# Patient Record
Sex: FEMALE | Race: WHITE | HISPANIC OR LATINO | Employment: FULL TIME | URBAN - METROPOLITAN AREA
[De-identification: names, ages, dates, MRNs, and addresses within clinical notes are randomized per-mention and may not be internally consistent; named-entity substitution may affect disease eponyms.]

---

## 2023-03-15 LAB
BASOPHILS (10*3/UL) IN BLOOD BY AUTOMATED COUNT: 0.04 X10E9/L (ref 0–0.1)
BASOPHILS/100 LEUKOCYTES IN BLOOD BY AUTOMATED COUNT: 0.6 % (ref 0–2)
C REACTIVE PROTEIN (MG/L) IN SER/PLAS: 0.54 MG/DL
CALCIDIOL (25 OH VITAMIN D3) (NG/ML) IN SER/PLAS: 32 NG/ML
EOSINOPHILS (10*3/UL) IN BLOOD BY AUTOMATED COUNT: 0.2 X10E9/L (ref 0–0.7)
EOSINOPHILS/100 LEUKOCYTES IN BLOOD BY AUTOMATED COUNT: 3.2 % (ref 0–6)
ERYTHROCYTE DISTRIBUTION WIDTH (RATIO) BY AUTOMATED COUNT: 12.8 % (ref 11.5–14.5)
ERYTHROCYTE MEAN CORPUSCULAR HEMOGLOBIN CONCENTRATION (G/DL) BY AUTOMATED: 34.1 G/DL (ref 32–36)
ERYTHROCYTE MEAN CORPUSCULAR VOLUME (FL) BY AUTOMATED COUNT: 88 FL (ref 80–100)
ERYTHROCYTES (10*6/UL) IN BLOOD BY AUTOMATED COUNT: 4.49 X10E12/L (ref 4–5.2)
HEMATOCRIT (%) IN BLOOD BY AUTOMATED COUNT: 39.3 % (ref 36–46)
HEMOGLOBIN (G/DL) IN BLOOD: 13.4 G/DL (ref 12–16)
IMMATURE GRANULOCYTES/100 LEUKOCYTES IN BLOOD BY AUTOMATED COUNT: 0.2 % (ref 0–0.9)
IRON (UG/DL) IN SER/PLAS: 84 UG/DL (ref 35–150)
IRON BINDING CAPACITY (UG/DL) IN SER/PLAS: 388 UG/DL (ref 240–445)
IRON SATURATION (%) IN SER/PLAS: 22 % (ref 25–45)
LEUKOCYTES (10*3/UL) IN BLOOD BY AUTOMATED COUNT: 6.3 X10E9/L (ref 4.4–11.3)
LYMPHOCYTES (10*3/UL) IN BLOOD BY AUTOMATED COUNT: 2.8 X10E9/L (ref 1.2–4.8)
LYMPHOCYTES/100 LEUKOCYTES IN BLOOD BY AUTOMATED COUNT: 44.2 % (ref 13–44)
MONOCYTES (10*3/UL) IN BLOOD BY AUTOMATED COUNT: 0.4 X10E9/L (ref 0.1–1)
MONOCYTES/100 LEUKOCYTES IN BLOOD BY AUTOMATED COUNT: 6.3 % (ref 2–10)
NEUTROPHILS (10*3/UL) IN BLOOD BY AUTOMATED COUNT: 2.89 X10E9/L (ref 1.2–7.7)
NEUTROPHILS/100 LEUKOCYTES IN BLOOD BY AUTOMATED COUNT: 45.5 % (ref 40–80)
NRBC (PER 100 WBCS) BY AUTOMATED COUNT: 0 /100 WBC (ref 0–0)
PLATELETS (10*3/UL) IN BLOOD AUTOMATED COUNT: 467 X10E9/L (ref 150–450)
SEDIMENTATION RATE, ERYTHROCYTE: 29 MM/H (ref 0–20)

## 2023-03-16 LAB
ALLERGEN ANIMAL: CAT DANDER IGE (KU/L): <0.1 KU/L
ALLERGEN ANIMAL: DOG DANDER IGE (KU/L): <0.1 KU/L
ALLERGEN GRASS: BERMUDA GRASS (CYNODON DACTYLON) IGE (KU/L): <0.1 KU/L
ALLERGEN GRASS: JOHNSON GRASS (SORGHUM HALEPENSE) IGE (KU/L): <0.1 KU/L
ALLERGEN GRASS: MEADOW GRASS, KENTUCKY BLUE (POA PRATENSIS )IGE (KU/L): <0.1 KU/L
ALLERGEN GRASS: TIMOTHY GRASS (PHLEUM PRATENSE) IGE (KU/L): <0.1 KU/L
ALLERGEN INSECT: COCKROACH IGE: <0.1 KU/L
ALLERGEN MICROORGANISM: ALTERNARIA ALTERNATA IGE (KU/L): <0.1 KU/L
ALLERGEN MICROORGANISM: ASPERGILLUS FUMIGATUS IGE (KU/L): <0.1 KU/L
ALLERGEN MICROORGANISM: CLADOSPORIUM HERBARUM IGE (KU/L): <0.1 KU/L
ALLERGEN MICROORGANISM: PENICILLIUM CHRYSOGENUM (P. NOTATUM) IGE (KU/L): <0.1 KU/L
ALLERGEN MITE: DERMATOPHAGOIDES FARINAE (HOUSE DUST MITE) IGE (KU/L): 2.95 KU/L
ALLERGEN MITE: DERMATOPHAGOIDES PTERONYSSINUS (HOUSE DUST MITE) IGE (KU/L): 2.51 KU/L
ALLERGEN TREE: BOX-ELDER (ACER NEGUNDO) IGE (KU/L): <0.1 KU/L
ALLERGEN TREE: COMMON SILVER BIRCH (BETULA VERRUCOSA) IGE (KU/L): <0.1 KU/L
ALLERGEN TREE: COTTONWOOD (POPULUS DELTOIDES) IGE (KU/L): <0.1 KU/L
ALLERGEN TREE: ELM (ULMUS AMERICANA) IGE (KU/L): <0.1 KU/L
ALLERGEN TREE: MAPLE LEAF SYCAMORE, LONDON PLANE IGE (KU/L): <0.1 KU/L
ALLERGEN TREE: MOUNTAIN JUNIPER (JUNIPERUS SABINOIDES) IGE (KU/L): <0.1 KU/L
ALLERGEN TREE: MULBERRY (MORUS ALBA) IGE (KU/L): <0.1 KU/L
ALLERGEN TREE: OAK (QUERCUS ALBA) IGE (KU/L): <0.1 KU/L
ALLERGEN TREE: PECAN, HICKORY (CARYA PECAN) IGE (KU/L): <0.1 KU/L
ALLERGEN TREE: WALNUT IGE: <0.1 KU/L
ALLERGEN TREE: WHITE ASH (FRAXINUS AMERICANA) IGE (KU/L): <0.1 KU/L
ALLERGEN WEED: COMMON PIGWEED (AMARANTHUS RETROFLEXUS) IGE (KU/L): <0.1 KU/L
ALLERGEN WEED: COMMON RAGWEED (AMB. ARTEMISIIFOLIA/A. ELATIOR) IGE (KU/L): <0.1 KU/L
ALLERGEN WEED: GOOSEFOOT, LAMB'S QUARTERS (CHENOPODIUM ALBUM) IGE (KU/L): <0.1 KU/L
ALLERGEN WEED: PLANTAIN (ENGLISH), RIBWORT (PLANTAGO LANCEOLATA) IGE (KU/L): <0.1 KU/L
ALLERGEN WEED: PRICKLY SALTWORT/RUSSIAN THISTLE (SALSOLA KALI) IGE (KU/L): <0.1 KU/L
ALLERGEN WEED: SHEEP SORREL (RUMEX ACETOSELLA) IGE (KU/L): <0.1 KU/L
ANTI-NUCLEAR ANTIBODY (ANA): NEGATIVE
IMMUNOCAP IGE: 64.6 KU/L (ref 0–214)
IMMUNOCAP INTERPRETATION: ABNORMAL

## 2023-03-18 PROBLEM — F41.8 SITUATIONAL ANXIETY: Status: ACTIVE | Noted: 2023-03-18

## 2023-03-18 PROBLEM — H93.12 LEFT-SIDED TINNITUS: Status: ACTIVE | Noted: 2023-03-18

## 2023-03-18 PROBLEM — N63.0 LUMP OR MASS IN BREAST: Status: ACTIVE | Noted: 2023-03-18

## 2023-03-18 PROBLEM — R10.2 PELVIC PAIN IN FEMALE: Status: ACTIVE | Noted: 2023-03-18

## 2023-03-18 PROBLEM — Z86.69 HISTORY OF MIGRAINE: Status: ACTIVE | Noted: 2023-03-18

## 2023-03-18 PROBLEM — R42 VERTIGO: Status: ACTIVE | Noted: 2023-03-18

## 2023-03-18 PROBLEM — R09.81 NASAL CONGESTION: Status: ACTIVE | Noted: 2023-03-18

## 2023-03-18 PROBLEM — R59.1 LYMPHADENOPATHY: Status: ACTIVE | Noted: 2023-03-18

## 2023-03-18 PROBLEM — F41.9 ANXIETY AND DEPRESSION: Status: ACTIVE | Noted: 2023-03-18

## 2023-03-18 PROBLEM — R43.1 ABNORMAL SMELL: Status: ACTIVE | Noted: 2023-03-18

## 2023-03-18 PROBLEM — J31.0 CHRONIC RHINITIS: Status: ACTIVE | Noted: 2023-03-18

## 2023-03-18 PROBLEM — L81.9 PIGMENTED SKIN LESION OF UNCERTAIN NATURE: Status: ACTIVE | Noted: 2023-03-18

## 2023-03-18 PROBLEM — H91.90 HEARING LOSS: Status: ACTIVE | Noted: 2023-03-18

## 2023-03-18 PROBLEM — J34.2 NASAL SEPTAL DEVIATION: Status: ACTIVE | Noted: 2023-03-18

## 2023-03-18 PROBLEM — M25.511 RIGHT SHOULDER PAIN: Status: ACTIVE | Noted: 2023-03-18

## 2023-03-18 PROBLEM — U07.1 COVID-19 VIRUS INFECTION: Status: ACTIVE | Noted: 2023-03-18

## 2023-03-18 PROBLEM — J34.89 NASAL DRAINAGE: Status: ACTIVE | Noted: 2023-03-18

## 2023-03-18 PROBLEM — E66.9 CLASS 2 OBESITY WITH BODY MASS INDEX (BMI) OF 36.0 TO 36.9 IN ADULT: Status: ACTIVE | Noted: 2023-03-18

## 2023-03-18 PROBLEM — J34.3 HYPERTROPHY OF BOTH INFERIOR NASAL TURBINATES: Status: ACTIVE | Noted: 2023-03-18

## 2023-03-18 PROBLEM — E66.812 CLASS 2 OBESITY WITH BODY MASS INDEX (BMI) OF 36.0 TO 36.9 IN ADULT: Status: ACTIVE | Noted: 2023-03-18

## 2023-03-18 PROBLEM — R35.0 URINARY FREQUENCY: Status: ACTIVE | Noted: 2023-03-18

## 2023-03-18 PROBLEM — E55.9 VITAMIN D DEFICIENCY: Status: ACTIVE | Noted: 2023-03-18

## 2023-03-18 PROBLEM — H52.10 MYOPIA: Status: ACTIVE | Noted: 2023-03-18

## 2023-03-18 PROBLEM — N93.8 DUB (DYSFUNCTIONAL UTERINE BLEEDING): Status: ACTIVE | Noted: 2023-03-18

## 2023-03-18 PROBLEM — F32.A ANXIETY AND DEPRESSION: Status: ACTIVE | Noted: 2023-03-18

## 2023-03-18 PROBLEM — R44.2 PHANTOSMIA: Status: ACTIVE | Noted: 2023-03-18

## 2023-03-18 PROBLEM — D64.9 ANEMIA: Status: ACTIVE | Noted: 2023-03-18

## 2023-03-18 LAB — ZINC,SERUM OR PLASMA: 86.8 UG/DL (ref 60–120)

## 2023-03-18 RX ORDER — FLUTICASONE PROPIONATE 50 MCG
2 SPRAY, SUSPENSION (ML) NASAL DAILY
COMMUNITY

## 2023-03-18 RX ORDER — OMEPRAZOLE 20 MG/1
1 CAPSULE, DELAYED RELEASE ORAL
COMMUNITY
Start: 2019-10-01 | End: 2024-01-10

## 2023-03-21 LAB — VITAMIN B6: 116.9 NMOL/L (ref 20–125)

## 2023-03-24 ENCOUNTER — TELEMEDICINE (OUTPATIENT)
Dept: PRIMARY CARE | Facility: CLINIC | Age: 33
End: 2023-03-24
Payer: COMMERCIAL

## 2023-03-24 DIAGNOSIS — R79.89 LOW VITAMIN D LEVEL: Primary | ICD-10-CM

## 2023-03-24 PROCEDURE — 99212 OFFICE O/P EST SF 10 MIN: CPT | Performed by: NURSE PRACTITIONER

## 2023-03-24 NOTE — PROGRESS NOTES
Pt scheduled for VV  check In call went to VM per DN  No video connect   Call to pt and VM, no answer.  Advised to reschedule ELAINE

## 2023-05-24 LAB
CLUE CELLS: NORMAL
NUGENT SCORE: 3
YEAST: NORMAL

## 2023-09-21 ENCOUNTER — OFFICE VISIT (OUTPATIENT)
Dept: PRIMARY CARE | Facility: CLINIC | Age: 33
End: 2023-09-21
Payer: COMMERCIAL

## 2023-09-21 VITALS
BODY MASS INDEX: 40.34 KG/M2 | DIASTOLIC BLOOD PRESSURE: 80 MMHG | WEIGHT: 242.4 LBS | SYSTOLIC BLOOD PRESSURE: 140 MMHG | TEMPERATURE: 98 F | OXYGEN SATURATION: 98 %

## 2023-09-21 DIAGNOSIS — D64.9 ANEMIA, UNSPECIFIED TYPE: ICD-10-CM

## 2023-09-21 DIAGNOSIS — E55.9 VITAMIN D DEFICIENCY: Primary | ICD-10-CM

## 2023-09-21 DIAGNOSIS — E66.01 CLASS 3 SEVERE OBESITY WITH BODY MASS INDEX (BMI) OF 40.0 TO 44.9 IN ADULT, UNSPECIFIED OBESITY TYPE, UNSPECIFIED WHETHER SERIOUS COMORBIDITY PRESENT (MULTI): ICD-10-CM

## 2023-09-21 DIAGNOSIS — Z00.00 HEALTH CARE MAINTENANCE: ICD-10-CM

## 2023-09-21 PROBLEM — E66.813 CLASS 3 SEVERE OBESITY WITH BODY MASS INDEX (BMI) OF 40.0 TO 44.9 IN ADULT: Status: ACTIVE | Noted: 2023-03-18

## 2023-09-21 PROCEDURE — 3008F BODY MASS INDEX DOCD: CPT | Performed by: NURSE PRACTITIONER

## 2023-09-21 PROCEDURE — 1036F TOBACCO NON-USER: CPT | Performed by: NURSE PRACTITIONER

## 2023-09-21 PROCEDURE — 99214 OFFICE O/P EST MOD 30 MIN: CPT | Performed by: NURSE PRACTITIONER

## 2023-09-21 RX ORDER — BUPROPION HYDROCHLORIDE 100 MG/1
TABLET ORAL
COMMUNITY
Start: 2023-04-19 | End: 2024-01-10

## 2023-09-21 RX ORDER — BUDESONIDE 90 UG/1
AEROSOL, POWDER RESPIRATORY (INHALATION)
COMMUNITY
Start: 2019-10-01 | End: 2024-01-29 | Stop reason: WASHOUT

## 2023-09-21 RX ORDER — MECLIZINE HYDROCHLORIDE 25 MG/1
TABLET ORAL
COMMUNITY
Start: 2020-04-19 | End: 2024-01-10

## 2023-09-21 ASSESSMENT — PATIENT HEALTH QUESTIONNAIRE - PHQ9
1. LITTLE INTEREST OR PLEASURE IN DOING THINGS: NOT AT ALL
2. FEELING DOWN, DEPRESSED OR HOPELESS: NOT AT ALL
SUM OF ALL RESPONSES TO PHQ9 QUESTIONS 1 AND 2: 0

## 2023-09-21 NOTE — PROGRESS NOTES
Subjective   Patient ID: Adrianne Rodríguez is a 33 y.o. female who presents for Weight Check (Having trouble losing weight ).    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    Busy but OK  good to have some time away from the house and kids.  Was seeing someone about mood.    Forgets to take RX then does not feel good.  5 kids  - now has niece and nephew living with them  Wants to have blood work done  Bruising easily  Back on weight watchers for 2 weeks.  Not losing weight.  Tubes are tied but was having 3 periods per month.    Seeing GYN tomorrow.   [place]   Not sure that sugar is an issue.  No dizziness.        Review of Systems   Constitutional:  Positive for fatigue.   Hematological:  Bruises/bleeds easily.   Psychiatric/Behavioral:  The patient is nervous/anxious.    All other systems reviewed and are negative.      Objective   /80   Temp 36.7 °C (98 °F)   Wt 110 kg (242 lb 6.4 oz)   BMI 40.34 kg/m²     Physical Exam  Vitals and nursing note reviewed.   Constitutional:       Appearance: She is obese. She is not ill-appearing.   HENT:      Head: Normocephalic and atraumatic.      Right Ear: Tympanic membrane, ear canal and external ear normal.      Left Ear: Tympanic membrane, ear canal and external ear normal.      Mouth/Throat:      Pharynx: Oropharynx is clear.   Eyes:      Conjunctiva/sclera: Conjunctivae normal.   Neck:      Thyroid: No thyroid mass, thyromegaly or thyroid tenderness.   Cardiovascular:      Rate and Rhythm: Regular rhythm.      Heart sounds: Normal heart sounds.   Pulmonary:      Effort: Pulmonary effort is normal.      Breath sounds: Normal breath sounds.   Abdominal:      General: Bowel sounds are normal.      Palpations: Abdomen is soft.   Neurological:      Mental Status: She is alert and oriented to person, place, and time.   Psychiatric:         Mood and Affect: Mood normal.         Behavior: Behavior normal.         Assessment/Plan   Problem List Items  Addressed This Visit             ICD-10-CM    Anemia D64.9    Relevant Orders    CBC and Auto Differential (Completed)    Iron and TIBC (Completed)    Ferritin (Completed)    Class 3 severe obesity with body mass index (BMI) of 40.0 to 44.9 in adult (CMS/[facility]) E66.01, Z68.41    Relevant Orders    Hemoglobin A1C (Completed)    Health care maintenance Z00.00    Relevant Orders    Comprehensive Metabolic Panel (Completed)    TSH with reflex to Free T4 if abnormal (Completed)    Lipid Panel (Completed)    Vitamin D deficiency - Primary E55.9    Relevant Orders    Vitamin D 25-Hydroxy,Total (for eval of Vitamin D levels) (Completed)

## 2023-09-21 NOTE — PATIENT INSTRUCTIONS
Good to see you today.  Labs when fasting 8 hours, well hydrated and no supplements  Work on consistency with your routine.    I will follow up with the labs.

## 2023-09-22 ENCOUNTER — LAB (OUTPATIENT)
Dept: LAB | Facility: LAB | Age: 33
End: 2023-09-22
Payer: COMMERCIAL

## 2023-09-22 DIAGNOSIS — E66.01 CLASS 3 SEVERE OBESITY WITH BODY MASS INDEX (BMI) OF 40.0 TO 44.9 IN ADULT, UNSPECIFIED OBESITY TYPE, UNSPECIFIED WHETHER SERIOUS COMORBIDITY PRESENT (MULTI): ICD-10-CM

## 2023-09-22 DIAGNOSIS — D64.9 ANEMIA, UNSPECIFIED TYPE: ICD-10-CM

## 2023-09-22 DIAGNOSIS — E55.9 VITAMIN D DEFICIENCY: ICD-10-CM

## 2023-09-22 DIAGNOSIS — Z00.00 HEALTH CARE MAINTENANCE: ICD-10-CM

## 2023-09-22 LAB
ALANINE AMINOTRANSFERASE (SGPT) (U/L) IN SER/PLAS: 18 U/L (ref 7–45)
ALBUMIN (G/DL) IN SER/PLAS: 4.6 G/DL (ref 3.4–5)
ALKALINE PHOSPHATASE (U/L) IN SER/PLAS: 59 U/L (ref 33–110)
ANION GAP IN SER/PLAS: 12 MMOL/L (ref 10–20)
ASPARTATE AMINOTRANSFERASE (SGOT) (U/L) IN SER/PLAS: 17 U/L (ref 9–39)
BASOPHILS (10*3/UL) IN BLOOD BY AUTOMATED COUNT: 0.06 X10E9/L (ref 0–0.1)
BASOPHILS/100 LEUKOCYTES IN BLOOD BY AUTOMATED COUNT: 1.1 % (ref 0–2)
BILIRUBIN TOTAL (MG/DL) IN SER/PLAS: 0.5 MG/DL (ref 0–1.2)
CALCIDIOL (25 OH VITAMIN D3) (NG/ML) IN SER/PLAS: 22 NG/ML
CALCIUM (MG/DL) IN SER/PLAS: 10 MG/DL (ref 8.6–10.6)
CARBON DIOXIDE, TOTAL (MMOL/L) IN SER/PLAS: 27 MMOL/L (ref 21–32)
CHLORIDE (MMOL/L) IN SER/PLAS: 104 MMOL/L (ref 98–107)
CHOLESTEROL (MG/DL) IN SER/PLAS: 180 MG/DL (ref 0–199)
CHOLESTEROL IN HDL (MG/DL) IN SER/PLAS: 41 MG/DL
CHOLESTEROL/HDL RATIO: 4.4
CREATININE (MG/DL) IN SER/PLAS: 0.65 MG/DL (ref 0.5–1.05)
EOSINOPHILS (10*3/UL) IN BLOOD BY AUTOMATED COUNT: 0.23 X10E9/L (ref 0–0.7)
EOSINOPHILS/100 LEUKOCYTES IN BLOOD BY AUTOMATED COUNT: 4.1 % (ref 0–6)
ERYTHROCYTE DISTRIBUTION WIDTH (RATIO) BY AUTOMATED COUNT: 12.3 % (ref 11.5–14.5)
ERYTHROCYTE MEAN CORPUSCULAR HEMOGLOBIN CONCENTRATION (G/DL) BY AUTOMATED: 35 G/DL (ref 32–36)
ERYTHROCYTE MEAN CORPUSCULAR VOLUME (FL) BY AUTOMATED COUNT: 89 FL (ref 80–100)
ERYTHROCYTES (10*6/UL) IN BLOOD BY AUTOMATED COUNT: 4.38 X10E12/L (ref 4–5.2)
ESTIMATED AVERAGE GLUCOSE FOR HBA1C: 97 MG/DL
FERRITIN (UG/LL) IN SER/PLAS: 59 UG/L (ref 8–150)
GFR FEMALE: >90 ML/MIN/1.73M2
GLUCOSE (MG/DL) IN SER/PLAS: 80 MG/DL (ref 74–99)
HEMATOCRIT (%) IN BLOOD BY AUTOMATED COUNT: 39.1 % (ref 36–46)
HEMOGLOBIN (G/DL) IN BLOOD: 13.7 G/DL (ref 12–16)
HEMOGLOBIN A1C/HEMOGLOBIN TOTAL IN BLOOD: 5 %
IMMATURE GRANULOCYTES/100 LEUKOCYTES IN BLOOD BY AUTOMATED COUNT: 0.2 % (ref 0–0.9)
IRON (UG/DL) IN SER/PLAS: 85 UG/DL (ref 35–150)
IRON BINDING CAPACITY (UG/DL) IN SER/PLAS: 364 UG/DL (ref 240–445)
IRON SATURATION (%) IN SER/PLAS: 23 % (ref 25–45)
LDL: 124 MG/DL (ref 0–99)
LEUKOCYTES (10*3/UL) IN BLOOD BY AUTOMATED COUNT: 5.6 X10E9/L (ref 4.4–11.3)
LYMPHOCYTES (10*3/UL) IN BLOOD BY AUTOMATED COUNT: 2.66 X10E9/L (ref 1.2–4.8)
LYMPHOCYTES/100 LEUKOCYTES IN BLOOD BY AUTOMATED COUNT: 47.8 % (ref 13–44)
MONOCYTES (10*3/UL) IN BLOOD BY AUTOMATED COUNT: 0.5 X10E9/L (ref 0.1–1)
MONOCYTES/100 LEUKOCYTES IN BLOOD BY AUTOMATED COUNT: 9 % (ref 2–10)
NEUTROPHILS (10*3/UL) IN BLOOD BY AUTOMATED COUNT: 2.11 X10E9/L (ref 1.2–7.7)
NEUTROPHILS/100 LEUKOCYTES IN BLOOD BY AUTOMATED COUNT: 37.8 % (ref 40–80)
NRBC (PER 100 WBCS) BY AUTOMATED COUNT: 0 /100 WBC (ref 0–0)
PLATELETS (10*3/UL) IN BLOOD AUTOMATED COUNT: 467 X10E9/L (ref 150–450)
POTASSIUM (MMOL/L) IN SER/PLAS: 4.2 MMOL/L (ref 3.5–5.3)
PROTEIN TOTAL: 7.5 G/DL (ref 6.4–8.2)
SODIUM (MMOL/L) IN SER/PLAS: 139 MMOL/L (ref 136–145)
THYROTROPIN (MIU/L) IN SER/PLAS BY DETECTION LIMIT <= 0.05 MIU/L: 0.54 MIU/L (ref 0.44–3.98)
TRIGLYCERIDE (MG/DL) IN SER/PLAS: 75 MG/DL (ref 0–149)
UREA NITROGEN (MG/DL) IN SER/PLAS: 13 MG/DL (ref 6–23)
VLDL: 15 MG/DL (ref 0–40)

## 2023-09-22 PROCEDURE — 84443 ASSAY THYROID STIM HORMONE: CPT

## 2023-09-22 PROCEDURE — 80053 COMPREHEN METABOLIC PANEL: CPT

## 2023-09-22 PROCEDURE — 83036 HEMOGLOBIN GLYCOSYLATED A1C: CPT

## 2023-09-22 PROCEDURE — 85025 COMPLETE CBC W/AUTO DIFF WBC: CPT

## 2023-09-22 PROCEDURE — 82306 VITAMIN D 25 HYDROXY: CPT

## 2023-09-22 PROCEDURE — 80061 LIPID PANEL: CPT

## 2023-09-22 PROCEDURE — 83550 IRON BINDING TEST: CPT

## 2023-09-22 PROCEDURE — 82728 ASSAY OF FERRITIN: CPT

## 2023-09-22 PROCEDURE — 83540 ASSAY OF IRON: CPT

## 2023-09-22 PROCEDURE — 36415 COLL VENOUS BLD VENIPUNCTURE: CPT

## 2023-09-23 LAB
CLUE CELLS: NORMAL
NUGENT SCORE: 2
YEAST: NORMAL

## 2023-09-27 DIAGNOSIS — E61.1 IRON DEFICIENCY: Primary | ICD-10-CM

## 2023-09-30 PROBLEM — Z00.00 HEALTH CARE MAINTENANCE: Status: ACTIVE | Noted: 2023-09-30

## 2023-09-30 ASSESSMENT — ENCOUNTER SYMPTOMS
FATIGUE: 1
NERVOUS/ANXIOUS: 1
BRUISES/BLEEDS EASILY: 1

## 2023-10-31 PROBLEM — H01.139 ECZEMA OF EYELID: Status: ACTIVE | Noted: 2023-04-27

## 2023-10-31 PROBLEM — G43.909 MIGRAINE: Status: ACTIVE | Noted: 2023-10-31

## 2023-10-31 PROBLEM — D23.71 OTHER BENIGN NEOPLASM OF SKIN OF RIGHT LOWER LIMB, INCLUDING HIP: Status: ACTIVE | Noted: 2023-04-27

## 2023-10-31 PROBLEM — E66.9 OBESITY: Status: ACTIVE | Noted: 2017-08-02

## 2023-10-31 PROBLEM — Z98.890 S/P BREAST BIOPSY, LEFT: Status: ACTIVE | Noted: 2023-10-31

## 2023-10-31 PROBLEM — R87.610 ATYPICAL SQUAMOUS CELLS OF UNDETERMINED SIGNIFICANCE (ASCUS) ON PAPANICOLAOU SMEAR OF CERVIX: Status: ACTIVE | Noted: 2023-10-31

## 2023-10-31 PROBLEM — M54.50 LOW BACK PAIN: Status: ACTIVE | Noted: 2017-06-07

## 2023-10-31 PROBLEM — Z98.891 H/O: C-SECTION: Status: ACTIVE | Noted: 2023-10-31

## 2023-10-31 PROBLEM — N76.0 ACUTE VAGINITIS: Status: ACTIVE | Noted: 2023-10-31

## 2023-10-31 PROBLEM — N60.19 FIBROCYSTIC BREAST CHANGES: Status: ACTIVE | Noted: 2017-01-25

## 2023-10-31 PROBLEM — K21.9 GASTROESOPHAGEAL REFLUX DISEASE: Status: ACTIVE | Noted: 2017-10-05

## 2023-10-31 RX ORDER — METRONIDAZOLE 7.5 MG/G
GEL VAGINAL
COMMUNITY
Start: 2023-09-22 | End: 2024-01-10

## 2023-10-31 RX ORDER — DOXYCYCLINE HYCLATE 100 MG
TABLET ORAL
COMMUNITY
Start: 2023-09-27 | End: 2024-01-10

## 2023-11-01 ENCOUNTER — OFFICE VISIT (OUTPATIENT)
Dept: HEMATOLOGY/ONCOLOGY | Facility: CLINIC | Age: 33
End: 2023-11-01
Payer: COMMERCIAL

## 2023-11-01 ENCOUNTER — LAB (OUTPATIENT)
Dept: LAB | Facility: CLINIC | Age: 33
End: 2023-11-01
Payer: COMMERCIAL

## 2023-11-01 VITALS
DIASTOLIC BLOOD PRESSURE: 63 MMHG | HEIGHT: 65 IN | SYSTOLIC BLOOD PRESSURE: 108 MMHG | HEART RATE: 69 BPM | RESPIRATION RATE: 16 BRPM | WEIGHT: 237.66 LBS | BODY MASS INDEX: 39.6 KG/M2 | TEMPERATURE: 97.9 F | OXYGEN SATURATION: 99 %

## 2023-11-01 DIAGNOSIS — E61.1 IRON DEFICIENCY: ICD-10-CM

## 2023-11-01 LAB
ALBUMIN SERPL BCP-MCNC: 4.5 G/DL (ref 3.4–5)
ALP SERPL-CCNC: 52 U/L (ref 33–110)
ALT SERPL W P-5'-P-CCNC: 15 U/L (ref 7–45)
ANION GAP SERPL CALC-SCNC: 12 MMOL/L (ref 10–20)
AST SERPL W P-5'-P-CCNC: 14 U/L (ref 9–39)
BASOPHILS # BLD AUTO: 0.07 X10*3/UL (ref 0–0.1)
BASOPHILS NFR BLD AUTO: 1 %
BILIRUB SERPL-MCNC: 0.5 MG/DL (ref 0–1.2)
BUN SERPL-MCNC: 13 MG/DL (ref 6–23)
CALCIUM SERPL-MCNC: 9.6 MG/DL (ref 8.6–10.3)
CHLORIDE SERPL-SCNC: 104 MMOL/L (ref 98–107)
CO2 SERPL-SCNC: 27 MMOL/L (ref 21–32)
CREAT SERPL-MCNC: 0.71 MG/DL (ref 0.5–1.05)
EOSINOPHIL # BLD AUTO: 0.19 X10*3/UL (ref 0–0.7)
EOSINOPHIL NFR BLD AUTO: 2.7 %
ERYTHROCYTE [DISTWIDTH] IN BLOOD BY AUTOMATED COUNT: 12.5 % (ref 11.5–14.5)
FERRITIN SERPL-MCNC: 62 NG/ML (ref 8–150)
GFR SERPL CREATININE-BSD FRML MDRD: >90 ML/MIN/1.73M*2
GLUCOSE SERPL-MCNC: 75 MG/DL (ref 74–99)
HCT VFR BLD AUTO: 40.8 % (ref 36–46)
HGB BLD-MCNC: 13.7 G/DL (ref 12–16)
IMM GRANULOCYTES # BLD AUTO: 0.02 X10*3/UL (ref 0–0.7)
IMM GRANULOCYTES NFR BLD AUTO: 0.3 % (ref 0–0.9)
IRON SATN MFR SERPL: 29 % (ref 25–45)
IRON SERPL-MCNC: 109 UG/DL (ref 35–150)
LYMPHOCYTES # BLD AUTO: 3.1 X10*3/UL (ref 1.2–4.8)
LYMPHOCYTES NFR BLD AUTO: 44.5 %
MCH RBC QN AUTO: 30.4 PG (ref 26–34)
MCHC RBC AUTO-ENTMCNC: 33.6 G/DL (ref 32–36)
MCV RBC AUTO: 91 FL (ref 80–100)
MONOCYTES # BLD AUTO: 0.64 X10*3/UL (ref 0.1–1)
MONOCYTES NFR BLD AUTO: 9.2 %
NEUTROPHILS # BLD AUTO: 2.95 X10*3/UL (ref 1.2–7.7)
NEUTROPHILS NFR BLD AUTO: 42.3 %
NRBC BLD-RTO: 0 /100 WBCS (ref 0–0)
PLATELET # BLD AUTO: 534 X10*3/UL (ref 150–450)
PMV BLD AUTO: 8.8 FL (ref 7.5–11.5)
POTASSIUM SERPL-SCNC: 4.5 MMOL/L (ref 3.5–5.3)
PROT SERPL-MCNC: 7.4 G/DL (ref 6.4–8.2)
RBC # BLD AUTO: 4.51 X10*6/UL (ref 4–5.2)
SODIUM SERPL-SCNC: 138 MMOL/L (ref 136–145)
TIBC SERPL-MCNC: 372 UG/DL (ref 240–445)
UIBC SERPL-MCNC: 263 UG/DL (ref 110–370)
WBC # BLD AUTO: 7 X10*3/UL (ref 4.4–11.3)

## 2023-11-01 PROCEDURE — 82728 ASSAY OF FERRITIN: CPT | Mod: PARLAB

## 2023-11-01 PROCEDURE — 80053 COMPREHEN METABOLIC PANEL: CPT

## 2023-11-01 PROCEDURE — 1036F TOBACCO NON-USER: CPT | Performed by: INTERNAL MEDICINE

## 2023-11-01 PROCEDURE — 99204 OFFICE O/P NEW MOD 45 MIN: CPT | Performed by: INTERNAL MEDICINE

## 2023-11-01 PROCEDURE — 99214 OFFICE O/P EST MOD 30 MIN: CPT | Performed by: INTERNAL MEDICINE

## 2023-11-01 PROCEDURE — 85025 COMPLETE CBC W/AUTO DIFF WBC: CPT

## 2023-11-01 PROCEDURE — 3008F BODY MASS INDEX DOCD: CPT | Performed by: INTERNAL MEDICINE

## 2023-11-01 PROCEDURE — 83540 ASSAY OF IRON: CPT

## 2023-11-01 PROCEDURE — 36415 COLL VENOUS BLD VENIPUNCTURE: CPT | Mod: PARLAB

## 2023-11-01 RX ORDER — FERROUS SULFATE 300 MG/5ML
60 LIQUID (ML) ORAL DAILY
COMMUNITY

## 2023-11-01 ASSESSMENT — PATIENT HEALTH QUESTIONNAIRE - PHQ9
8. MOVING OR SPEAKING SO SLOWLY THAT OTHER PEOPLE COULD HAVE NOTICED. OR THE OPPOSITE, BEING SO FIGETY OR RESTLESS THAT YOU HAVE BEEN MOVING AROUND A LOT MORE THAN USUAL: 0
1. LITTLE INTEREST OR PLEASURE IN DOING THINGS: NOT AT ALL
SUM OF ALL RESPONSES TO PHQ QUESTIONS 1-9: 0
4. FEELING TIRED OR HAVING LITTLE ENERGY: NOT AT ALL
3. TROUBLE FALLING OR STAYING ASLEEP OR SLEEPING TOO MUCH: NOT AT ALL
2. FEELING DOWN, DEPRESSED OR HOPELESS: NOT AT ALL
3. TROUBLE FALLING OR STAYING ASLEEP OR SLEEPING TOO MUCH: 0
5. POOR APPETITE OR OVEREATING: NOT AT ALL
8. MOVING OR SPEAKING SO SLOWLY THAT OTHER PEOPLE COULD HAVE NOTICED. OR THE OPPOSITE, BEING SO FIGETY OR RESTLESS THAT YOU HAVE BEEN MOVING AROUND A LOT MORE THAN USUAL: NOT AT ALL
2. FEELING DOWN, DEPRESSED, IRRITABLE, OR HOPELESS: NOT AT ALL
4. FEELING TIRED OR HAVING LITTLE ENERGY: NOT AT ALL
2. FEELING DOWN, DEPRESSED, IRRITABLE, OR HOPELESS: 0
8. MOVING OR SPEAKING SO SLOWLY THAT OTHER PEOPLE COULD HAVE NOTICED. OR THE OPPOSITE, BEING SO FIGETY OR RESTLESS THAT YOU HAVE BEEN MOVING AROUND A LOT MORE THAN USUAL: NOT AT ALL
4. FEELING TIRED OR HAVING LITTLE ENERGY: 0
7. TROUBLE CONCENTRATING ON THINGS, SUCH AS READING THE NEWSPAPER OR WATCHING TELEVISION: 0
1. LITTLE INTEREST OR PLEASURE IN DOING THINGS: 0
7. TROUBLE CONCENTRATING ON THINGS, SUCH AS READING THE NEWSPAPER OR WATCHING TELEVISION: NOT AT ALL
SUM OF ALL RESPONSES TO PHQ QUESTIONS 1-9: 0
5. POOR APPETITE OR OVEREATING: NOT AT ALL
1. LITTLE INTEREST OR PLEASURE IN DOING THINGS: NOT AT ALL
9. THOUGHTS THAT YOU WOULD BE BETTER OFF DEAD, OR OF HURTING YOURSELF: NOT AT ALL
9. THOUGHTS THAT YOU WOULD BE BETTER OFF DEAD, OR OF HURTING YOURSELF: NOT AT ALL
6. FEELING BAD ABOUT YOURSELF - OR THAT YOU ARE A FAILURE OR HAVE LET YOURSELF OR YOUR FAMILY DOWN: NOT AT ALL
6. FEELING BAD ABOUT YOURSELF - OR THAT YOU ARE A FAILURE OR HAVE LET YOURSELF OR YOUR FAMILY DOWN: NOT AT ALL
7. TROUBLE CONCENTRATING ON THINGS, SUCH AS READING THE NEWSPAPER OR WATCHING TELEVISION: NOT AT ALL
5. POOR APPETITE OR OVEREATING: 0
3. TROUBLE FALLING OR STAYING ASLEEP OR SLEEPING TOO MUCH: NOT AT ALL
9. THOUGHTS THAT YOU WOULD BE BETTER OFF DEAD, OR OF HURTING YOURSELF: 0
6. FEELING BAD ABOUT YOURSELF - OR THAT YOU ARE A FAILURE OR HAVE LET YOURSELF OR YOUR FAMILY DOWN: 0

## 2023-11-01 ASSESSMENT — PAIN SCALES - GENERAL: PAINLEVEL: 0-NO PAIN

## 2023-11-01 NOTE — PROGRESS NOTES
Patient ID: Adrianne Rodríguez is a 33 y.o. female.  Referring Physician: FADI Galo  [address]  [facility], [place], [address]  [place] [address]  Primary Care Provider: FADI Galo      Subjective    HPI  The patient is a 33-year-old woman referred to me for further evaluation and management of decreased iron saturation as well as elevated platelet count.  A CBC on September 22, 2023 revealed hemoglobin 13.7 g/dL platelets 467,000/mm³ iron saturation decreased at 23%.  The patient complains of easy fatigability and shortness of breath on exertion requesting assistance.  The patient also complains of heavy menstrual cycles.    At interview on November 1, 2023 the patient narrated entire history.  Denied history of weight loss, fevers, night sweats, chest pain, shortness of breath at rest, nausea, vomiting, hematemesis, melena, hematochezia and hematuria.    Active Problems  Problems    · Abnormal smell (781.1) (R43.1)   · Acute vaginitis (616.10) (N76.0)   · Anemia (285.9) (D64.9)   · Antibody response exam (V72.61) (Z01.84)   · Anxiety and depression (300.00,311) (F41.9,F32.A)   · Bacterial vaginosis (616.10,041.9) (N76.0,B96.89)   · Body mass index (BMI) of 37.0 to 37.9 in adult (V85.37) (Z68.37)   · Chronic rhinitis (472.0) (J31.0)   · Class 2 obesity with body mass index (BMI) of 36.0 to 36.9 in adult (278.00,V85.36)  (E66.9,Z68.36)   · DUB (dysfunctional uterine bleeding) (626.8) (N93.8)   · Exposure to communicable disease (V01.9) (Z20.9)   · Hearing loss (389.9) (H91.90)   · History of migraine (V12.49) (Z86.69)   · Hypertrophy of both inferior nasal turbinates (478.0) (J34.3)   · Left-sided tinnitus (388.30) (H93.12)   · Lump or mass in breast (611.72) (N63.0)   · Lymphadenopathy (785.6) (R59.1)   · Myopia (367.1) (H52.10)   · Nasal congestion (478.19) (R09.81)   · Nasal drainage (478.19) (J34.89)   · Nasal septal deviation (470) (J34.2)   · Pelvic pain in female (625.9)  (R10.2)   · 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) (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.2)   · Pigmented skin lesion of uncertain nature (709.00) (L81.9)   · Right shoulder pain (719.41) (M25.511)   · Screening for thyroid disorder (V77.0) (Z13.29)   · Situational anxiety (300.09) (F41.8)   · Urinary frequency (788.41) (R35.0)   · Vertigo (780.4) (R42)   · Vitamin D deficiency (268.9) (E55.9)     Past Medical History  Problems    · History of 14 weeks gestation of pregnancy (V22.2) (Z3A.14)   · Resolved Date: 16 Jun 2021   · History of 16 weeks gestation of pregnancy (V22.2) (Z3A.16)   · Resolved Date: 14 Jul 2021   · History of 20 weeks gestation of pregnancy (V22.2) (Z3A.20)   · Resolved Date: 11 Aug 2021   · History of 24 weeks gestation of pregnancy (V22.2) (Z3A.24)   · Resolved Date: 09 Sep 2021   · History of 28 weeks gestation of pregnancy (V22.2) (Z3A.28)   · Resolved Date: 13 Oct 2021   · History of 33 weeks gestation of pregnancy (V22.2) (Z3A.33)   · Resolved Date: 28 Oct 2021   · History of 35 weeks gestation of pregnancy (V22.2) (Z3A.35)   · Resolved Date: 14 Dec 2021   · History of Abnormal CAT scan (793.99) (R93.89)   · Resolved Date: 30 Mar 2021   · History of Abnormal TSH (790.6) (R79.89)   · Resolved Date: 30 Mar 2021   · History of Acute bronchospasm (519.11) (J98.01)   · Resolved Date: 30 Mar 2021   · History of Amenorrhea, secondary (626.0) (N91.1)   · Resolved Date: 14 Dec 2021   · History of Bilateral knee pain (719.46) (M25.561,M25.562)   · Resolved Date: 26 Apr 2021   · History of Body mass index (BMI) of 40.0 to 44.9 in adult (V85.41) (Z68.41)   · Resolved Date: 26 Apr 2021   · History of COVID-19 virus infection (079.89) (U07.1)   · Resolved Date: 22 May 2023   · 5/2022   · History of Encounter for IUD insertion (V25.11) (Z30.430)   · Resolved Date: 22 May 2023   · History of Encounter for postoperative care (V58.49) (Z48.89)   · Resolved Date: 22 May 2023   · History of Encounter for postpartum visit (V24.2) (Z39.2)   · Resolved Date: 24  Mar 2022   · History of Encounter for supervision of normal pregnancy in multigravida in third  trimester (V22.1) (Z34.83)   · Resolved Date: 14 Dec 2021   · History of Frequent PVCs (427.69) (I49.3)   · Resolved Date: 2021   · History of Hip pain (719.45) (M25.559)   · Resolved Date: 2021   · History of abnormal cervical Papanicolaou smear (V13.29) (Z87.42)   · History of anxiety (V11.8) (Z86.59)   · History of depression (V11.8) (Z86.59)   · History of environmental allergies (V15.09) (Z91.09)   · Resolved Date: 30 Mar 2021   · History of flank pain (V13.89) (Z87.898)   · Resolved Date: 2021   · History of gastroesophageal reflux (GERD) (V12.79) (Z87.19)   · Resolved Date: 14 Mar 2023   · History of influenza vaccination (V49.89) (Z92.29)   · Resolved Date: 14 Dec 2021   · History of palpitations (V12.59) (Z87.898)   · Resolved Date: 2021   · History of pregnancy (V13.29)   · Resolved Date: 14 Dec 2021   · History of syncope (V15.89) (Z87.898)   · Resolved Date: 2021   · History of threatened  (V45.89) (Z87.59)   · Resolved Date: 2021   · History of vertigo (V12.49) (Z87.898)   · Resolved Date: 2022   · 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) (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)   · History of Inappropriate diet and eating habits (V69.1) (Z72.4)   · Resolved Date: 2021   · History of Knee pain, right (719.46) (M25.561)   · Resolved Date: 2021   · History of Low iron stores (790.6) (R79.0)   · Resolved Date: 2021   · History of Major depressive disorder, recurrent episode with anxious distress (296.30)  (F33.9)   · Resolved Date: 2021   · History of Malaise and fatigue (780.79) (R53.81,R53.83)   · Resolved Date: 30 Mar 2021   · History of Mastitis, postpartum (675.24) (O91.22)   · Resolved Date: 30 Mar 2021   · History of Need for Tdap vaccination (V06.1) (Z23)   · Resolved Date: 14 Dec 2021   · History of Obesity (BMI 30-39.9) (278.00) (E66.9)   · Resolved Date: 26  2021   · History of Pain of left breast (611.71) (N64.4)   · Resolved Date: 30 Mar 2021   · History of Positive urine pregnancy test (V72.42) (Z32.01)   · Resolved Date: 2021   · History of Pregnancy, excessive vomiting (643.90) (O21.9)   · Resolved Date: 14 Dec 2021   · History of Vaginal discomfort (625.9) (N94.9)   · Resolved Date: 24 Mar 2022   · History of Vision disturbance (368.9) (H53.9)   · Resolved Date: 2021   · History of Visual field scotoma (368.44) (H53.419)   · Resolved Date: 2021     Surgical History  Problems    · History of Breast surgery   · History of Cervical loop electrosurgical excision   · History of  section   · History of Colposcopy   · History of Tubal ligation     Family History  Mother    · Family history of amblyopia (V19.19) (Z83.518)   · Family history of depression (V17.0) (Z81.8)   · Family history of Substance use disorder  Brother    · Family history of Substance use disorder  Paternal Grandmother    · Family history of malignant neoplasm of breast (V16.3) (Z80.3)  Maternal Aunt    · Family history of malignant neoplasm of brain (V16.8) (Z80.8)   · Family history of malignant neoplasm of breast (V16.3) (Z80.3)  Paternal Aunt    · Family history of bipolar disorder (V17.0) (Z81.8)     Social History  Problems    · Daily caffeine consumption   · Does not chew tobacco (V49.89) (Z78.9)   · Does not use smokeless tobacco (V49.89) (Z78.9)   · Has 3 children   ·    · Never a smoker   · No illicit drug use   · Social alcohol use (Z78.9)   · Student   · Tubal ligation use     Allergies  Medication    · No Known Drug Allergies   Recorded By: Carlee Abreu; 3/6/2018 3:45:05 PM  NonMedication    · No Known Food Allergies   Recorded By: Sarai Hanna; 10/1/2019 4:01:32 PM     Current Meds     Medication Name Instruction   Fluticasone Propionate 50 MCG/ACT Nasal Suspension USE 2 SPRAYS IN EACH NOSTRIL ONCE DAILY   Liletta (52 MG) 20.1 MCG/DAY Intrauterine  "Intrauterine Device     Omeprazole 20 MG Oral Capsule Delayed Release take 1 capsule by mouth every morning 30-60 MINUTES BEFORE BREAKFAST            Review of Systems   All other systems reviewed and are negative.       Objective   BSA: 2.23 meters squared  /63 (BP Location: Left arm, Patient Position: Sitting, BP Cuff Size: Large adult long)   Pulse 69   Temp 36.6 °C (97.9 °F) (Temporal)   Resp 16   Ht 1.66 m (5' 5.35\")   Wt 108 kg (237 lb 10.5 oz)   SpO2 99%   BMI 39.12 kg/m²     Family History   Problem Relation Name Age of Onset    Amblyopia Mother      Depression Mother      Other (Substance Use Disorder) Mother      Other (Substance Use Disorder) Brother      Breast cancer Mother's Sister      Brain cancer Mother's Sister      Bipolar disorder Father's Sister      Breast cancer Paternal Grandmother       Oncology History    No history exists.       Adrianne Rodríguez  reports that she has never smoked. She has never used smokeless tobacco.  She  has no history on file for alcohol use.  She  has no history on file for drug use.    Physical Exam  Constitutional:       Appearance: Normal appearance.   HENT:      Head: Normocephalic and atraumatic.      Nose: Nose normal.      Mouth/Throat:      Mouth: Mucous membranes are moist.      Pharynx: Oropharynx is clear.   Eyes:      Extraocular Movements: Extraocular movements intact.      Conjunctiva/sclera: Conjunctivae normal.      Pupils: Pupils are equal, round, and reactive to light.   Cardiovascular:      Rate and Rhythm: Normal rate and regular rhythm.   Pulmonary:      Effort: Pulmonary effort is normal.      Breath sounds: Normal breath sounds.   Abdominal:      General: Abdomen is flat. Bowel sounds are normal.      Palpations: Abdomen is soft.   Musculoskeletal:         General: Normal range of motion.      Cervical back: Normal range of motion and neck supple.   Neurological:      General: No focal deficit present.      Mental Status: She is " alert and oriented to person, place, and time. Mental status is at baseline.   Psychiatric:         Mood and Affect: Mood normal.         Behavior: Behavior normal.         Thought Content: Thought content normal.         Judgment: Judgment normal.         Performance Status:  Asymptomatic    Assessment/Plan    The patient is a 33-year-old woman referred to me for further evaluation and management of decreased iron saturation as well as elevated platelet count.  A CBC on September 22, 2023 revealed hemoglobin 13.7 g/dL platelets 467,000/mm³ iron saturation decreased at 23%.  The patient complains of easy fatigability and shortness of breath on exertion requesting assistance.  The patient also complains of heavy menstrual cycles.    Physical examination revealed obesity.  I explained to the patient that iron deficiency anemia can culminate into elevated platelet counts.  We reviewed several years worth of data.  The patient hemoglobin at baseline is around 13.5 to 13.7 g/dL.  3 years ago the patient had delivered baby.  During that evaluation the patient's hemoglobin was down to 12.1 g/dL, decreased iron saturation and elevated platelet count.  Since then the patient's platelet count is trending down with current level of 467,000/mm³ with reference range of 150 to 450,000/mm³.  Most likely source of iron deficiency is her heavy menstrual period's.  The patient is currently receiving over-the-counter iron.  I have recommended over-the-counter ferrous sulfate 325 mg p.o. half an hour after food starting with 1 tablet daily and increasing it to 3 times a day.  Reassess in 3 months.  I have recommended a baseline CBC and iron indices for today.  The patient understood appreciated all the details provided and was grateful.    Vitamin D deficiency: Level of 22 NG per mL.    Recommend vitamin D2, 50,000 units by mouth once a week for 12 weeks followed by over-the-counter vitamin D3 1000 units/day.    Thank you for allowing  me to participate in care of your patient if you have any questions please feel free to call me.      Diagnoses and all orders for this visit:  Iron deficiency  -     Referral to Hematology  -     CBC and Auto Differential; Standing  -     Comprehensive Metabolic Panel; Standing  -     Ferritin; Standing  -     Iron and TIBC; Standing  -     Clinic Appointment Request; Future  -     Oncology Line Draw Appointment Request; Future           Kvng Valenzuela MD

## 2023-11-04 DIAGNOSIS — E66.09 CLASS 2 OBESITY DUE TO EXCESS CALORIES WITHOUT SERIOUS COMORBIDITY WITH BODY MASS INDEX (BMI) OF 39.0 TO 39.9 IN ADULT: ICD-10-CM

## 2023-11-04 DIAGNOSIS — M25.50 ARTHRALGIA, UNSPECIFIED JOINT: Primary | ICD-10-CM

## 2023-11-10 PROBLEM — E66.09 CLASS 2 OBESITY DUE TO EXCESS CALORIES WITHOUT SERIOUS COMORBIDITY WITH BODY MASS INDEX (BMI) OF 39.0 TO 39.9 IN ADULT: Status: ACTIVE | Noted: 2017-08-02

## 2023-11-10 PROBLEM — E66.812 CLASS 2 OBESITY DUE TO EXCESS CALORIES WITHOUT SERIOUS COMORBIDITY WITH BODY MASS INDEX (BMI) OF 39.0 TO 39.9 IN ADULT: Status: ACTIVE | Noted: 2017-08-02

## 2023-11-14 ENCOUNTER — APPOINTMENT (OUTPATIENT)
Dept: PRIMARY CARE | Facility: CLINIC | Age: 33
End: 2023-11-14
Payer: COMMERCIAL

## 2023-11-28 ENCOUNTER — APPOINTMENT (OUTPATIENT)
Dept: RHEUMATOLOGY | Facility: CLINIC | Age: 33
End: 2023-11-28
Payer: COMMERCIAL

## 2023-12-15 ENCOUNTER — OFFICE VISIT (OUTPATIENT)
Dept: ORTHOPEDIC SURGERY | Facility: CLINIC | Age: 33
End: 2023-12-15
Payer: COMMERCIAL

## 2023-12-15 DIAGNOSIS — M75.101 TEAR OF RIGHT ROTATOR CUFF, UNSPECIFIED TEAR EXTENT, UNSPECIFIED WHETHER TRAUMATIC: ICD-10-CM

## 2023-12-15 DIAGNOSIS — M67.921 BICEPS TENDINOPATHY, RIGHT: ICD-10-CM

## 2023-12-15 DIAGNOSIS — M25.811 IMPINGEMENT OF RIGHT SHOULDER: ICD-10-CM

## 2023-12-15 DIAGNOSIS — M25.511 RIGHT SHOULDER PAIN, UNSPECIFIED CHRONICITY: ICD-10-CM

## 2023-12-15 PROCEDURE — 1036F TOBACCO NON-USER: CPT | Performed by: PHYSICIAN ASSISTANT

## 2023-12-15 PROCEDURE — 20610 DRAIN/INJ JOINT/BURSA W/O US: CPT | Performed by: PHYSICIAN ASSISTANT

## 2023-12-15 PROCEDURE — 99214 OFFICE O/P EST MOD 30 MIN: CPT | Performed by: PHYSICIAN ASSISTANT

## 2023-12-15 PROCEDURE — 3008F BODY MASS INDEX DOCD: CPT | Performed by: PHYSICIAN ASSISTANT

## 2023-12-15 RX ORDER — TRIAMCINOLONE ACETONIDE 40 MG/ML
40 INJECTION, SUSPENSION INTRA-ARTICULAR; INTRAMUSCULAR
Status: COMPLETED | OUTPATIENT
Start: 2023-12-15 | End: 2023-12-15

## 2023-12-15 RX ORDER — LIDOCAINE HYDROCHLORIDE 5 MG/ML
4 INJECTION, SOLUTION INFILTRATION; PERINEURAL
Status: COMPLETED | OUTPATIENT
Start: 2023-12-15 | End: 2023-12-15

## 2023-12-15 RX ADMIN — TRIAMCINOLONE ACETONIDE 40 MG: 40 INJECTION, SUSPENSION INTRA-ARTICULAR; INTRAMUSCULAR at 10:02

## 2023-12-15 RX ADMIN — LIDOCAINE HYDROCHLORIDE 4 ML: 5 INJECTION, SOLUTION INFILTRATION; PERINEURAL at 10:02

## 2023-12-15 NOTE — PROGRESS NOTES
History of Present Illness  33-year-old female presents for exacerbation of right shoulder pain.  I previously saw the patient for her right shoulder which point we gave her steroid injection and she began physical therapy along with NSAIDs as needed for pain and she only noticed temporary improvement of her right shoulder pain.  We attempted to order an MRI of the right shoulder however it was unfortunately denied.  She returns today for continued right shoulder pain.  She denies any new trauma or injury, though she does have children which she is regularly lifting and carrying.  She also describes pain with overhead reaching and lifting.    Past Medical History:   Diagnosis Date    14 weeks gestation of pregnancy 06/02/2021    14 weeks gestation of pregnancy    16 weeks gestation of pregnancy 06/16/2021    16 weeks gestation of pregnancy    20 weeks gestation of pregnancy 07/14/2021    20 weeks gestation of pregnancy    24 weeks gestation of pregnancy 08/30/2021    24 weeks gestation of pregnancy    28 weeks gestation of pregnancy 09/09/2021    28 weeks gestation of pregnancy    33 weeks gestation of pregnancy 10/13/2021    33 weeks gestation of pregnancy    35 weeks gestation of pregnancy 10/28/2021    35 weeks gestation of pregnancy    Abnormal findings on diagnostic imaging of other specified body structures 04/17/2020    Abnormal CAT scan    Abnormal level of blood mineral 08/25/2020    Low iron stores    Acute bronchospasm 09/26/2019    Acute bronchospasm    Body mass index (BMI)40.0-44.9, adult 02/10/2021    Body mass index (BMI) of 40.0 to 44.9 in adult    Encounter for immunization 10/13/2021    Need for Tdap vaccination    Encounter for pregnancy test, result positive 03/27/2021    Positive urine pregnancy test    Encounter for routine postpartum follow-up 12/15/2021    Encounter for postpartum visit    Encounter for supervision of other normal pregnancy, third trimester 10/28/2021    Encounter for  supervision of normal pregnancy in multigravida in third trimester    Inappropriate diet and eating habits 2020    Inappropriate diet and eating habits    Major depressive disorder, recurrent, unspecified (CMS/[facility]) 01/15/2019    Major depressive disorder, recurrent episode with anxious distress    Mastodynia 2020    Pain of left breast    Nonpurulent mastitis associated with the puerperium 2020    Mastitis, postpartum    Obesity, unspecified 2019    Obesity (BMI 30-39.9)    Other allergy status, other than to drugs and biological substances 2020    History of environmental allergies    Other conditions influencing health status 2021    History of pregnancy    Other malaise 2020    Malaise and fatigue    Other specified abnormal findings of blood chemistry 2020    Abnormal TSH    Pain in right knee     Bilateral knee pain    Pain in right knee 2021    Knee pain, right    Pain in unspecified hip 2019    Hip pain    Papillomavirus as the cause of diseases classified elsewhere     HPV in female    Personal history of other complications of pregnancy, childbirth and the puerperium 2021    History of threatened     Personal history of other diseases of the female genital tract     History of abnormal cervical Papanicolaou smear    Personal history of other drug therapy 10/13/2021    History of influenza vaccination    Personal history of other specified conditions 2020    History of syncope    Personal history of other specified conditions 02/10/2021    History of flank pain    Personal history of other specified conditions 2022    History of vertigo    Personal history of other specified conditions 2019    History of palpitations    Scotoma involving central area, unspecified eye 2020    Visual field scotoma    Secondary amenorrhea 2021    Amenorrhea, secondary    Unspecified condition associated with female genital  organs and menstrual cycle 02/17/2022    Vaginal discomfort    Unspecified visual disturbance 04/28/2020    Vision disturbance    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 Sig Documenting Provider Last Dose Status   budesonide (Pulmicort Flexhaler) 90 mcg/actuation inhaler [id number] No  Historical Provider, MD Taking Active   buPROPion (Wellbutrin) 100 mg tablet [id number] No Take 1 oral tablet once a day Historical Provider, MD Not Taking Active   doxycycline (Vibra-Tabs) 100 mg tablet [id number] No  Historical Provider, MD Not Taking Active   ferrous sulfate 300 mg (60 mg iron)/5 mL syrup [id number]  Take 5 mL (60 mg of iron) by mouth once daily. Historical Provider, MD  Active   fluticasone (Flonase) 50 mcg/actuation nasal spray [id number] No Administer 2 sprays into each nostril once daily. Historical Provider, MD Taking Active   levonorgestrel (LILETTA UTRN) [id number] No 52 mg by intrauterine route. Historical Provider, MD Not Taking Active   meclizine (Antivert) 25 mg tablet [id number] No  Historical Provider, MD Not Taking Active   metroNIDAZOLE (Metrogel) 0.75 % (37.5mg/5 gram) vaginal gel [id number] No  Historical Provider, MD Taking Active   omeprazole (PriLOSEC) 20 mg DR capsule [id number] No Take 1 capsule (20 mg) by mouth once daily in the morning. Take before meals. 30-60 minutes before breakfast Historical Provider, MD Taking Active                    No Known Allergies    Social History     Socioeconomic History    Marital status:      Spouse name: Not on file    Number of children: Not on file    Years of education: Not on file    Highest education level: Not on file   Occupational History    Not on file   Tobacco Use    Smoking status: Never     Smokeless tobacco: Never   Substance and Sexual Activity    Alcohol use: Not on file    Drug use: Not on file    Sexual activity: Not on file   Other Topics Concern    Not on file   Social History Narrative    Not on file     Social Determinants of Health     Financial Resource Strain: Not on file   Food Insecurity: Not on file   Transportation Needs: Not on file   Physical Activity: Not on file   Stress: Not on file   Social Connections: Not on file   Intimate Partner Violence: Not on file   Housing Stability: Not on file       Past Surgical History:   Procedure Laterality Date    OTHER SURGICAL HISTORY  2020     section    OTHER SURGICAL HISTORY  2021    Colposcopy    OTHER SURGICAL HISTORY  2021    Breast surgery    OTHER SURGICAL HISTORY  2021    Cervical loop electrosurgical excision    OTHER SURGICAL HISTORY  2022    Tubal ligation         Review of Systems    GENERAL: Negative  GI: Negative  MUSCULOSKELETAL: See HPI  SKIN: Negative  NEURO:  Negative     Physical Exam  Constitutional  General appearance:  Alert, oriented, and in no acute distress.  Well developed, well nourished.  Head and Face  Head and face:  Normocephalic and atraumatic.  Ears, Nose, Mouth, and Throat  External inspection of ears and nose: Normal.  Eyes:  Pupils are equal and round.  Neck  Neck:  no neck mass was observed.  Pulmonary  Respiratory effort:  no respiratory distress.  Cardiovascular  Intact distal pulses.  Lymphatic  Palpation of lymph nodes in the affected extremity:  Normal.  Skin  Skin and subcutaneous tissue:  Normal skin color and pigmentation.  Normal skin turgor.  No rashes.  Neurologic  Reflexes:  deep tendon reflexes were 2+ and symmetric.  Sensation:  normal to light touch.  Psychiatric  Judgement and insight:  Intact.  Mood and affect:  Normal.  Musculoskeletal  Right shoulder range of motion forward flexion abduction 140 degrees.  Externally rotates 70 degrees.  Internally  rotates to L3.  Positive acromioclavicular joint tenderness.  Positive biceps tendon tenderness.  Positive Neer's.  Positive Reyes.  Jobes positive for pain however she has good strength.  Right upper extremity is neurovascularly intact.    Patient ID: Adrianne Rodríguez is a 33 y.o. female.    L Inj/Asp: R subacromial bursa on 12/15/2023 10:02 AM  Indications: pain  Details: 22 G needle, posterior approach  Medications: 40 mg triamcinolone acetonide 40 mg/mL; 4 mL lidocaine 5 mg/mL (0.5 %)  Outcome: tolerated well, no immediate complications  Procedure, treatment alternatives, risks and benefits explained, specific risks discussed. Consent was given by the patient. Immediately prior to procedure a time out was called to verify the correct patient, procedure, equipment, support staff and site/side marked as required. Patient was prepped and draped in the usual sterile fashion.           Assessment  Right shoulder pain, right shoulder impingement, right shoulder biceps tendinopathy    Plan  Had a long discussion with the patient regarding her right shoulder pain.  Discussed fact that we have already attempted extensive conservative treatment, however unfortunately the MRI was denied.  Therefore we will continue with conservative treatment.  I offer the patient a steroid injection into the right shoulder today which she wanted proceed with and tolerated well.  Encouraged her to continue taking anti-inflammatories as needed.  Will also give her an order for physical therapy to work on strengthening and range of motion of the right shoulder.  We will place an order for an MRI attempt to get that approved.  If the MRI is approved we would then have her follow-up with Dr. Sanders to discuss results and further treatment options.  The patient should call the office during business hours (9am-3pm; Monday - Friday)  with any questions or problems.  If the patient has any urgent issues outside of business hours, they should  go to a local Emergency Room.

## 2024-01-10 ENCOUNTER — PROCEDURE VISIT (OUTPATIENT)
Dept: OBSTETRICS AND GYNECOLOGY | Facility: CLINIC | Age: 34
End: 2024-01-10
Payer: COMMERCIAL

## 2024-01-10 VITALS — HEIGHT: 65 IN | WEIGHT: 240 LBS | BODY MASS INDEX: 39.99 KG/M2

## 2024-01-10 DIAGNOSIS — Z30.432 ENCOUNTER FOR IUD REMOVAL: ICD-10-CM

## 2024-01-10 PROCEDURE — 58301 REMOVE INTRAUTERINE DEVICE: CPT | Performed by: OBSTETRICS & GYNECOLOGY

## 2024-01-10 PROCEDURE — 99213 OFFICE O/P EST LOW 20 MIN: CPT | Performed by: OBSTETRICS & GYNECOLOGY

## 2024-01-10 RX ORDER — NORETHINDRONE ACETATE AND ETHINYL ESTRADIOL 1.5-30(21)
1 KIT ORAL DAILY
Qty: 28 TABLET | Refills: 5 | Status: SHIPPED | OUTPATIENT
Start: 2024-01-10 | End: 2024-01-29 | Stop reason: WASHOUT

## 2024-01-10 NOTE — PROGRESS NOTES
Subjective   Patient ID: Adrianne Rodríguez is a 33 y.o. female who presents for Procedure (Remove Liletta IUD//C/o  pain with IUD/- wants to start a birth control pill//Chaperone declined).  HPI  As contained in the chief complaint  Review of Systems    Objective   Physical Exam  Vital signs reviewed      GENITOURINARY- External genitalia: Normal.                                 - Uterus: AV/AF NSSC, mobile and nontender                                -The adnexal areas are free of tenderness or mass                                -There are no cervical lesions; there is no cervical motion tenderness                                -Vagina without lesions, mucosa pink and well-hydrated, discharge is physiologic.                                   Inspection of Perianal Area: Normal    Assessment/Plan   Diagnoses and all orders for this visit:  Encounter for IUD removal  -IUD removed without difficulty begin a combination birth control pill         Redd Jones DO 01/10/24 11:01 AM

## 2024-01-15 ENCOUNTER — HOSPITAL ENCOUNTER (OUTPATIENT)
Dept: RADIOLOGY | Facility: CLINIC | Age: 34
Discharge: HOME | End: 2024-01-15
Payer: COMMERCIAL

## 2024-01-15 DIAGNOSIS — M75.101 TEAR OF RIGHT ROTATOR CUFF, UNSPECIFIED TEAR EXTENT, UNSPECIFIED WHETHER TRAUMATIC: ICD-10-CM

## 2024-01-15 DIAGNOSIS — M67.921 BICEPS TENDINOPATHY, RIGHT: ICD-10-CM

## 2024-01-15 PROCEDURE — 73221 MRI JOINT UPR EXTREM W/O DYE: CPT | Mod: RT

## 2024-01-15 PROCEDURE — 73221 MRI JOINT UPR EXTREM W/O DYE: CPT | Mod: RIGHT SIDE | Performed by: RADIOLOGY

## 2024-01-16 ENCOUNTER — APPOINTMENT (OUTPATIENT)
Dept: RADIOLOGY | Facility: CLINIC | Age: 34
End: 2024-01-16
Payer: COMMERCIAL

## 2024-01-19 ENCOUNTER — OFFICE VISIT (OUTPATIENT)
Dept: ORTHOPEDIC SURGERY | Facility: CLINIC | Age: 34
End: 2024-01-19
Payer: COMMERCIAL

## 2024-01-19 VITALS — BODY MASS INDEX: 39.32 KG/M2 | HEIGHT: 65 IN | WEIGHT: 236 LBS

## 2024-01-19 DIAGNOSIS — M75.111 INCOMPLETE ROTATOR CUFF TEAR OR RUPTURE OF RIGHT SHOULDER, NOT SPECIFIED AS TRAUMATIC: Primary | ICD-10-CM

## 2024-01-19 DIAGNOSIS — M75.41 IMPINGEMENT SYNDROME OF RIGHT SHOULDER: ICD-10-CM

## 2024-01-19 DIAGNOSIS — S43.431A LABRAL TEAR OF SHOULDER, RIGHT, INITIAL ENCOUNTER: ICD-10-CM

## 2024-01-19 DIAGNOSIS — M75.21 BICEPS TENDINITIS OF RIGHT SHOULDER: ICD-10-CM

## 2024-01-19 PROCEDURE — 3008F BODY MASS INDEX DOCD: CPT | Performed by: ORTHOPAEDIC SURGERY

## 2024-01-19 PROCEDURE — 20610 DRAIN/INJ JOINT/BURSA W/O US: CPT | Performed by: ORTHOPAEDIC SURGERY

## 2024-01-19 PROCEDURE — 99214 OFFICE O/P EST MOD 30 MIN: CPT | Performed by: ORTHOPAEDIC SURGERY

## 2024-01-19 PROCEDURE — 1036F TOBACCO NON-USER: CPT | Performed by: ORTHOPAEDIC SURGERY

## 2024-01-19 RX ORDER — LIDOCAINE HYDROCHLORIDE 10 MG/ML
4 INJECTION INFILTRATION; PERINEURAL
Status: COMPLETED | OUTPATIENT
Start: 2024-01-19 | End: 2024-01-19

## 2024-01-19 RX ADMIN — LIDOCAINE HYDROCHLORIDE 4 ML: 10 INJECTION INFILTRATION; PERINEURAL at 11:48

## 2024-01-19 ASSESSMENT — PAIN SCALES - GENERAL: PAINLEVEL_OUTOF10: 9

## 2024-01-19 ASSESSMENT — PAIN - FUNCTIONAL ASSESSMENT: PAIN_FUNCTIONAL_ASSESSMENT: 0-10

## 2024-01-19 NOTE — PROGRESS NOTES
33-year-old female with continued right shoulder pain.  Patient is already done extensive conservative treatment with subacromial steroid injections physical therapy and anti-inflammatories.  She continues getting pain over the anterior lateral and posterior aspects of her right shoulder.  Pain is worse with writing doing laundry and carrying things    Patients' self reported past medical history, medications, allergies, surgical history, family and social history as well as a 10 point review of systems has been documented in the new patient intake form and scanned into the patient's electronic medical record.  The intake form was reviewed by Dr Sanders during the office visit and signed by Dr. Sanders and the patient.  Pertinent findings are documented in the HPI.    General Multi-System Physical Exam:  Constitutional  General appearance:  Alert, oriented, and in no acute distress.  Well developed, well nourished.  Head and Face  Head and face:  Normocephalic and atraumatic.  Ears, Nose, Mouth, and Throat  External inspection of ears and nose: Normal.  Eyes:  Pupils are equal and round.  Neck  Neck:  no neck mass was observed.  Pulmonary  Respiratory effort:  no respiratory distress.  Cardiovascular  Intact distal pulses.  Lymphatic  Palpation of lymph nodes in the affected extremity:  Normal.  Skin  Skin and subcutaneous tissue:  Normal skin color and pigmentation.  Normal skin turgor.  No rashes.  Neurologic  Sensation:  normal to light touch.  Psychiatric  Judgement and insight:  Intact.  Mood and affect:  Normal.  Musculoskeletal  Right shoulder is 140 degrees of abduction forward flexion 80 degrees of external rotation internal rotates L1 very positive biceps tenderness that recreates her pain mild acromioclavicular joint tenderness mild tenderness over her os acromion positive Neer positive Reyes positive Jobes with pain and mild weakness    Dr Sanders independently interpreted the patient's MRI (performed by  the Radiology department) by viewing the MRI images and this is Dr. Sanders's personal interpretation:     MRI of the right shoulder shows mild supraspinatus tendinitis with mild subacromial bursitis and an os acromion as well as labrum tearing and biceps tendinopathy    We discussed her os acromion.  Due to the fact that she only mildly tender over this I do not think this is causing majority of her symptoms.  At this time I think majority of her symptoms are coming from her biceps.  We talked about trying a steroid injection of the biceps which she wanted to proceed with and tolerated well.  If the injection really helps her biceps pain we could consider a shoulder scope with arthroscopic biceps tenodesis.  However if the injection does not help her at all then that would be a sign that the biceps is not really causing the symptoms.  Will see her back as needed        This patient has had longstanding pain and weakness in their affected extremity which has gotten worse over the last few months.  Non-operative treatment has failed to help this patient and the pain is worsening.  That would classify this problem as a chronic illness with exacerbation and progression.    Due to this patient's condition, they are at a moderate risk of morbidity from additional diagnostic testing / treatment.      To help them with their pain, I wrote them a prescription for prescription strength anti-inflammatories.  The patient was informed that there are risks of using nonsteroidal antiinflammatory (NSAID) medications.    Risks of NSAIDS include, but are not limited to, upset stomach, ulcers in the stomach and other places in the gastrointestinal tract, and a mild increase in cardiovascular risk as a result of the antiinflammatory medications.  In addition, there is an increased risk in bleeding as a result of the medications.    The patient was advised to stop taking the NSAIDs if they cause them to have an upset stomach.  NSAIDs  are not supposed to be taken every day for more than a few weeks.  If they have any questions or problems with the antiinflammatory medications, they should stop taking the medication immediately and call the office.      Patient ID: Adrianne Rodríguez is a 33 y.o. female.    L Inj/Asp: R subacromial bursa (R shoulder injection - bicipital groove of the proximal humerus) on 1/19/2024 11:48 AM  Indications: pain  Details: 22 G needle, anterior approach  Medications: 40 mg triamcinolone acetonide 10 mg/mL; 4 mL lidocaine 10 mg/mL (1 %)    Right shoulder bicipital groove of proximal humerus injection  Procedure, treatment alternatives, risks and benefits explained, specific risks discussed. Consent was given by the patient. Immediately prior to procedure a time out was called to verify the correct patient, procedure, equipment, support staff and site/side marked as required. Patient was prepped and draped in the usual sterile fashion.

## 2024-01-29 ENCOUNTER — OFFICE VISIT (OUTPATIENT)
Dept: PRIMARY CARE | Facility: CLINIC | Age: 34
End: 2024-01-29
Payer: COMMERCIAL

## 2024-01-29 VITALS
BODY MASS INDEX: 38.61 KG/M2 | TEMPERATURE: 97.3 F | SYSTOLIC BLOOD PRESSURE: 124 MMHG | WEIGHT: 232 LBS | DIASTOLIC BLOOD PRESSURE: 82 MMHG

## 2024-01-29 DIAGNOSIS — G25.81 RESTLESS LEG SYNDROME: Primary | ICD-10-CM

## 2024-01-29 PROBLEM — S69.90XA INJURY OF WRIST: Status: ACTIVE | Noted: 2024-01-29

## 2024-01-29 PROBLEM — E66.01 SEVERE OBESITY (MULTI): Status: ACTIVE | Noted: 2023-03-18

## 2024-01-29 PROBLEM — E66.9 OBESITY DUE TO ENERGY IMBALANCE: Status: ACTIVE | Noted: 2017-08-02

## 2024-01-29 PROBLEM — E66.01 MORBID OBESITY (MULTI): Status: ACTIVE | Noted: 2017-08-02

## 2024-01-29 PROBLEM — R44.2 OLFACTORY HALLUCINATIONS: Status: ACTIVE | Noted: 2023-03-18

## 2024-01-29 PROBLEM — H66.90 OTITIS MEDIA: Status: ACTIVE | Noted: 2024-01-29

## 2024-01-29 PROBLEM — M75.20 BICEPS TENDINITIS: Status: ACTIVE | Noted: 2024-01-29

## 2024-01-29 PROBLEM — J34.89 NASAL DISCHARGE: Status: ACTIVE | Noted: 2023-03-18

## 2024-01-29 PROBLEM — R82.998 LEUKOCYTES IN URINE: Status: ACTIVE | Noted: 2023-03-18

## 2024-01-29 PROBLEM — J34.3 HYPERTROPHY OF NASAL TURBINATES: Status: ACTIVE | Noted: 2023-03-14

## 2024-01-29 PROBLEM — R11.2 NAUSEA AND VOMITING: Status: ACTIVE | Noted: 2024-01-29

## 2024-01-29 PROBLEM — R07.81 RIB PAIN: Status: ACTIVE | Noted: 2024-01-29

## 2024-01-29 PROBLEM — N39.0 URINARY TRACT INFECTION: Status: ACTIVE | Noted: 2024-01-29

## 2024-01-29 PROBLEM — H93.19 TINNITUS: Status: ACTIVE | Noted: 2024-01-29

## 2024-01-29 PROBLEM — M75.101 TEAR OF RIGHT ROTATOR CUFF: Status: ACTIVE | Noted: 2024-01-29

## 2024-01-29 PROBLEM — M25.579 ANKLE PAIN: Status: ACTIVE | Noted: 2024-01-29

## 2024-01-29 PROBLEM — O46.90 BLEEDING FROM GENITAL TRACT DURING PREGNANCY (HHS-HCC): Status: ACTIVE | Noted: 2024-01-29

## 2024-01-29 PROBLEM — D23.71 BENIGN NEOPLASM OF SKIN OF RIGHT LOWER EXTREMITY: Status: ACTIVE | Noted: 2023-04-27

## 2024-01-29 PROBLEM — H01.139 ECZEMATOUS DERMATITIS OF EYELID: Status: ACTIVE | Noted: 2022-02-16

## 2024-01-29 PROBLEM — R55 SYNCOPE: Status: ACTIVE | Noted: 2024-01-29

## 2024-01-29 PROBLEM — R09.82 POSTNASAL DRIP: Status: ACTIVE | Noted: 2024-01-29

## 2024-01-29 PROBLEM — J34.2 DEVIATED NASAL SEPTUM: Status: ACTIVE | Noted: 2023-03-14

## 2024-01-29 PROBLEM — F41.8 MIXED ANXIETY DEPRESSIVE DISORDER: Status: ACTIVE | Noted: 2023-03-18

## 2024-01-29 PROBLEM — M75.111 NONTRAUMATIC INCOMPLETE TEAR OF RIGHT ROTATOR CUFF: Status: ACTIVE | Noted: 2024-01-29

## 2024-01-29 PROBLEM — M25.539 PAIN IN WRIST: Status: ACTIVE | Noted: 2024-01-29

## 2024-01-29 PROBLEM — R63.2 POLYPHAGIA: Status: ACTIVE | Noted: 2024-01-29

## 2024-01-29 PROBLEM — R59.9 ADENOPATHY: Status: ACTIVE | Noted: 2023-03-18

## 2024-01-29 PROBLEM — S43.439A GLENOID LABRUM TEAR: Status: ACTIVE | Noted: 2024-01-29

## 2024-01-29 PROBLEM — R43.1 UNUSUAL SMELL IN NOSE: Status: ACTIVE | Noted: 2023-03-18

## 2024-01-29 PROBLEM — H93.12 TINNITUS OF LEFT EAR: Status: ACTIVE | Noted: 2023-03-18

## 2024-01-29 PROBLEM — L03.032 PARONYCHIA OF TOE OF LEFT FOOT: Status: ACTIVE | Noted: 2024-01-29

## 2024-01-29 PROBLEM — R10.11 RIGHT UPPER QUADRANT ABDOMINAL PAIN: Status: ACTIVE | Noted: 2023-03-18

## 2024-01-29 PROBLEM — M75.41 IMPINGEMENT SYNDROME OF RIGHT SHOULDER: Status: ACTIVE | Noted: 2024-01-29

## 2024-01-29 PROBLEM — J31.0 RHINITIS: Status: ACTIVE | Noted: 2023-03-14

## 2024-01-29 PROBLEM — R93.89 ABNORMAL COMPUTED TOMOGRAPHY SCAN: Status: ACTIVE | Noted: 2024-01-29

## 2024-01-29 PROBLEM — E61.1 IRON DEFICIENCY: Status: ACTIVE | Noted: 2023-03-18

## 2024-01-29 PROBLEM — R00.2 PALPITATIONS: Status: ACTIVE | Noted: 2024-01-29

## 2024-01-29 PROBLEM — R05.3 PERSISTENT COUGH: Status: ACTIVE | Noted: 2024-01-29

## 2024-01-29 PROBLEM — K59.00 CONSTIPATION: Status: ACTIVE | Noted: 2024-01-29

## 2024-01-29 PROBLEM — R09.A2 SENSATION OF LUMP IN THROAT: Status: ACTIVE | Noted: 2024-01-29

## 2024-01-29 PROBLEM — M25.511 PAIN OF RIGHT SHOULDER REGION: Status: ACTIVE | Noted: 2023-03-18

## 2024-01-29 PROBLEM — L73.9 FOLLICULITIS: Status: ACTIVE | Noted: 2024-01-29

## 2024-01-29 PROBLEM — N93.9 ABNORMAL UTERINE BLEEDING: Status: ACTIVE | Noted: 2023-03-18

## 2024-01-29 PROBLEM — G43.909 MIGRAINE HEADACHE: Status: ACTIVE | Noted: 2023-10-31

## 2024-01-29 PROCEDURE — 1036F TOBACCO NON-USER: CPT | Performed by: NURSE PRACTITIONER

## 2024-01-29 PROCEDURE — 99214 OFFICE O/P EST MOD 30 MIN: CPT | Performed by: NURSE PRACTITIONER

## 2024-01-29 PROCEDURE — 3008F BODY MASS INDEX DOCD: CPT | Performed by: NURSE PRACTITIONER

## 2024-01-29 RX ORDER — LORATADINE 10 MG/1
10 TABLET ORAL DAILY
COMMUNITY

## 2024-01-29 RX ORDER — OMEPRAZOLE 20 MG/1
20 TABLET, DELAYED RELEASE ORAL
COMMUNITY

## 2024-01-29 RX ORDER — ROPINIROLE 2 MG/1
2 TABLET, FILM COATED ORAL NIGHTLY
Qty: 30 TABLET | Refills: 0 | Status: SHIPPED | OUTPATIENT
Start: 2024-01-29 | End: 2024-04-10 | Stop reason: WASHOUT

## 2024-01-29 ASSESSMENT — PATIENT HEALTH QUESTIONNAIRE - PHQ9
SUM OF ALL RESPONSES TO PHQ9 QUESTIONS 1 AND 2: 0
1. LITTLE INTEREST OR PLEASURE IN DOING THINGS: NOT AT ALL
2. FEELING DOWN, DEPRESSED OR HOPELESS: NOT AT ALL

## 2024-01-29 NOTE — PROGRESS NOTES
Subjective   Patient ID: Adrianne Rodríguez is a 33 y.o. female who presents for Restless Legs (Getting bad, sciatic pain).    HPI   Long hx of leg pain, especially at night  Has restless legs    Has used Flexeril at bedtime to help her sleep  Took this on and off for years.  Has never taken anything else.  Has never had a sleep study    Review of Systems   Musculoskeletal:         Leg pain - at lateral aspects estella at night   All other systems reviewed and are negative.      Objective   /82   Temp 36.3 °C (97.3 °F)   Wt 105 kg (232 lb)   BMI 38.61 kg/m²     Physical Exam  Vitals and nursing note reviewed.   Constitutional:       Appearance: Normal appearance.   HENT:      Head: Normocephalic and atraumatic.      Mouth/Throat:      Pharynx: Oropharynx is clear.   Cardiovascular:      Pulses: Normal pulses.      Heart sounds: Normal heart sounds.   Pulmonary:      Effort: Pulmonary effort is normal.      Breath sounds: Normal breath sounds.   Abdominal:      General: Bowel sounds are normal.      Palpations: Abdomen is soft.   Skin:     General: Skin is warm.   Neurological:      Mental Status: She is alert and oriented to person, place, and time.      Motor: No weakness.      Coordination: Coordination normal.      Gait: Gait normal.      Deep Tendon Reflexes: Reflexes normal.   Psychiatric:         Mood and Affect: Mood normal.         Behavior: Behavior normal.         Thought Content: Thought content normal.         Assessment/Plan   Problem List Items Addressed This Visit             ICD-10-CM    Restless leg syndrome - Primary G25.81    Relevant Medications    rOPINIRole (Requip) 2 mg tablet     IF not improving, consider sleep study

## 2024-01-30 ENCOUNTER — APPOINTMENT (OUTPATIENT)
Dept: PRIMARY CARE | Facility: CLINIC | Age: 34
End: 2024-01-30
Payer: COMMERCIAL

## 2024-02-01 PROBLEM — G25.81 RESTLESS LEG SYNDROME: Status: ACTIVE | Noted: 2024-02-01

## 2024-02-02 ENCOUNTER — TELEPHONE (OUTPATIENT)
Dept: HEMATOLOGY/ONCOLOGY | Facility: CLINIC | Age: 34
End: 2024-02-02
Payer: COMMERCIAL

## 2024-02-02 NOTE — TELEPHONE ENCOUNTER
Left VM for patient to have labs done before MD appt as some of the labs take a couple hours to result.

## 2024-03-15 ENCOUNTER — OFFICE VISIT (OUTPATIENT)
Dept: ORTHOPEDIC SURGERY | Facility: CLINIC | Age: 34
End: 2024-03-15
Payer: COMMERCIAL

## 2024-03-15 ENCOUNTER — HOSPITAL ENCOUNTER (OUTPATIENT)
Dept: RADIOLOGY | Facility: CLINIC | Age: 34
Discharge: HOME | End: 2024-03-15
Payer: COMMERCIAL

## 2024-03-15 DIAGNOSIS — M25.511 RIGHT SHOULDER PAIN, UNSPECIFIED CHRONICITY: ICD-10-CM

## 2024-03-15 DIAGNOSIS — M75.21 BICEPS TENDINITIS OF RIGHT SHOULDER: Primary | ICD-10-CM

## 2024-03-15 DIAGNOSIS — M75.41 IMPINGEMENT SYNDROME OF RIGHT SHOULDER: ICD-10-CM

## 2024-03-15 DIAGNOSIS — S43.431A LABRAL TEAR OF SHOULDER, RIGHT, INITIAL ENCOUNTER: ICD-10-CM

## 2024-03-15 PROCEDURE — 73030 X-RAY EXAM OF SHOULDER: CPT | Mod: RT

## 2024-03-15 PROCEDURE — 1036F TOBACCO NON-USER: CPT | Performed by: ORTHOPAEDIC SURGERY

## 2024-03-15 PROCEDURE — L3670 SO ACRO/CLAV CAN WEB PRE OTS: HCPCS | Performed by: ORTHOPAEDIC SURGERY

## 2024-03-15 PROCEDURE — 99215 OFFICE O/P EST HI 40 MIN: CPT | Performed by: ORTHOPAEDIC SURGERY

## 2024-03-15 PROCEDURE — 3008F BODY MASS INDEX DOCD: CPT | Performed by: ORTHOPAEDIC SURGERY

## 2024-03-15 PROCEDURE — 73030 X-RAY EXAM OF SHOULDER: CPT | Mod: RIGHT SIDE | Performed by: RADIOLOGY

## 2024-03-15 NOTE — PROGRESS NOTES
33-year-old female with continued right shoulder pain.  Patient states that when I saw her 2 months ago and gave her steroid injection in the right biceps tendon she did extremely well and her pain was gone.  She was very happy with the results and her pain was essentially gone in the right shoulder.  However as the steroid injections been wearing off the pains been coming back again over the anterior aspect the shoulder directly over the proximal biceps tendon    Patients' self reported past medical history, medications, allergies, surgical history, family and social history as well as a 10 point review of systems has been documented in the new patient intake form and scanned into the patient's electronic medical record.  The intake form was reviewed by Dr Sanders during the office visit and signed by Dr. Sanders and the patient.  Pertinent findings are documented in the HPI.    General Multi-System Physical Exam:  Constitutional  General appearance:  Alert, oriented, and in no acute distress.  Well developed, well nourished.  Head and Face  Head and face:  Normocephalic and atraumatic.  Ears, Nose, Mouth, and Throat  External inspection of ears and nose: Normal.  Eyes:  Pupils are equal and round.  Neck  Neck:  no neck mass was observed.  Pulmonary  Respiratory effort:  no respiratory distress.  Cardiovascular  Intact distal pulses.  Lymphatic  Palpation of lymph nodes in the affected extremity:  Normal.  Skin  Skin and subcutaneous tissue:  Normal skin color and pigmentation.  Normal skin turgor.  No rashes.  Neurologic  Sensation:  normal to light touch.  Psychiatric  Judgement and insight:  Intact.  Mood and affect:  Normal.  Musculoskeletal  Right shoulder is 150 degrees of abduction forward flexion 80 degrees of external rotation internal rotates L1 positive tenderness over the biceps that recreates her pain no tenderness over the acromioclavicular joint no tenderness over the acromion where she has an os  acromion.  Positive Neer positive Reyes positive Jobes with pain with minimal weakness neurovascular tact right upper extremity    We had a long discussion in regards to her right shoulder.  We talked about the proximal biceps tendinopathy as well as the labrum tearing and the impingement.  We talked about continue treating her conservatively but she wants to proceed with something more definitive.  Patient wants to proceed with a right shoulder scope, arthroscopic biceps tenodesis, extensive debridement, subacromial decompression partial acromioplasty.  She understands the risk versus benefits and no guarantees were made.  She signed appropriate surgical consents.  Will plan for surgery on May 23 as per the patient's wishes        I, Dr. Sanders, had a long discussion with the patient / patient's family regarding the risks versus benefits of surgery and all of the treatment options, including nonoperative treatment and surgical treatment options. We discussed the risks of surgery.      The risks of surgery include, but are not limited to, nerve damage, artery damage, muscle damage, risk of bleeding or infection, risk of bone fracture, risk of post-operative stiffness, risk of failure of the surgery with possible continued pain or possible need for additional surgery.  Other risks include the risk of hardware pain and possible need for removal of the surgical hardware at another time.   The risks of undergoing anesthesia including, but are not limited to, stroke, heart attack or death.      After a long discussion, the patient / patient's family understood all of the risks versus benefits of surgery and decided that they wanted to proceed with surgery. The patient / guardian signed appropriate surgical consent forms.    Since this patient will be undergoing surgery, I expect the patient to be in significant additional pain in the immediate postoperative period as a result of the surgical procedure. Non-opioid pain  medications will not be sufficient to treat this acute, sharp, postoperative pain. Therefore we will be prescribing the patient narcotic pain medications for the immediate postoperative period in addition to numerous non-narcotic pain medications in order to try to help the patient with their post-operative pain.  However, as the pain from surgery subsides, we will work diligently to wean the patient off of all narcotics as quickly as possible.   If the patient requires more narcotic pain medications than we typically see with patients undergoing this surgery, we will refer the patient to a pain management specialist within the first few weeks after their surgical procedure. The patient's OARRS report was reviewed within the last 90 days.        This patient has had longstanding pain and weakness in their affected extremity which has gotten significantly worse over the last few months.  Non-operative treatment has failed to help this patient and the pain is severe enough to warrent surgery as the appropriate treatment at this time.  That would classify this problem as a chronic illness with severe exacerbation and progression.    We discussed their surgery at great length.  This is an elective major surgery which is warranted in this case due to the patient's failure of non-operative treatment and their significant level of pain and progression of the disease.  We discussed identified patient and procedural risk factors and how these risk factors may increase the risk of the surgery or influence the outcome of the surgical procedure.   We also discussed how delaying surgery and continuing non-operative treatment may lead to a progression of the disease and high risk of further morbidity.

## 2024-04-05 ENCOUNTER — APPOINTMENT (OUTPATIENT)
Dept: PRIMARY CARE | Facility: CLINIC | Age: 34
End: 2024-04-05
Payer: COMMERCIAL

## 2024-04-10 ENCOUNTER — OFFICE VISIT (OUTPATIENT)
Dept: PRIMARY CARE | Facility: CLINIC | Age: 34
End: 2024-04-10
Payer: COMMERCIAL

## 2024-04-10 VITALS
SYSTOLIC BLOOD PRESSURE: 124 MMHG | TEMPERATURE: 97.7 F | DIASTOLIC BLOOD PRESSURE: 82 MMHG | WEIGHT: 230 LBS | HEIGHT: 65 IN | BODY MASS INDEX: 38.32 KG/M2

## 2024-04-10 DIAGNOSIS — E66.9 CLASS 2 OBESITY WITHOUT SERIOUS COMORBIDITY WITH BODY MASS INDEX (BMI) OF 38.0 TO 38.9 IN ADULT, UNSPECIFIED OBESITY TYPE: Primary | ICD-10-CM

## 2024-04-10 DIAGNOSIS — R53.83 OTHER FATIGUE: ICD-10-CM

## 2024-04-10 DIAGNOSIS — E78.2 MIXED HYPERLIPIDEMIA: ICD-10-CM

## 2024-04-10 DIAGNOSIS — E55.9 VITAMIN D DEFICIENCY: ICD-10-CM

## 2024-04-10 DIAGNOSIS — R73.09 ELEVATED GLUCOSE: ICD-10-CM

## 2024-04-10 PROBLEM — G25.81 RESTLESS LEGS SYNDROME: Status: ACTIVE | Noted: 2024-02-01

## 2024-04-10 PROCEDURE — 3008F BODY MASS INDEX DOCD: CPT | Performed by: NURSE PRACTITIONER

## 2024-04-10 PROCEDURE — 99214 OFFICE O/P EST MOD 30 MIN: CPT | Performed by: NURSE PRACTITIONER

## 2024-04-10 PROCEDURE — 1036F TOBACCO NON-USER: CPT | Performed by: NURSE PRACTITIONER

## 2024-04-10 ASSESSMENT — PATIENT HEALTH QUESTIONNAIRE - PHQ9
SUM OF ALL RESPONSES TO PHQ9 QUESTIONS 1 AND 2: 0
2. FEELING DOWN, DEPRESSED OR HOPELESS: NOT AT ALL
1. LITTLE INTEREST OR PLEASURE IN DOING THINGS: NOT AT ALL

## 2024-04-10 NOTE — PROGRESS NOTES
"Subjective   Patient ID: Adrianne Rodríguez is a 33 y.o. female who presents for Follow-up (Would like to get labs done, gets red warm spots on her face).    HPI   Working with  - working out since January.  Seems to be gaining muscle and not losing  249#  to start & 230 # now  Has changed portions.  Not drinking any POP, skim milk, less carbs, More water  Lowery feeling   Does not eat first thing.  9:30 to gym  and eats when she comes back.    Review of Systems   Constitutional:  Positive for activity change and unexpected weight change.   Endocrine: Negative for heat intolerance.   Skin:  Negative for color change.   All other systems reviewed and are negative.      Objective   /82   Temp 36.5 °C (97.7 °F)   Ht 1.651 m (5' 5\")   Wt 104 kg (230 lb)   BMI 38.27 kg/m²     Physical Exam  Vitals and nursing note reviewed.   Constitutional:       Appearance: She is obese.   HENT:      Head: Normocephalic and atraumatic.      Right Ear: Tympanic membrane, ear canal and external ear normal.      Left Ear: Tympanic membrane, ear canal and external ear normal.      Mouth/Throat:      Pharynx: Oropharynx is clear.   Neck:      Thyroid: No thyroid mass, thyromegaly or thyroid tenderness.   Cardiovascular:      Rate and Rhythm: Normal rate and regular rhythm.      Pulses: Normal pulses.      Heart sounds: Normal heart sounds.   Pulmonary:      Effort: Pulmonary effort is normal.      Breath sounds: Normal breath sounds.   Abdominal:      General: Bowel sounds are normal.      Palpations: Abdomen is soft.   Skin:     General: Skin is warm.   Neurological:      Mental Status: She is alert and oriented to person, place, and time. Mental status is at baseline.   Psychiatric:         Mood and Affect: Mood normal.         Behavior: Behavior normal.         Thought Content: Thought content normal.         Judgment: Judgment normal.         Assessment/Plan   Problem List Items Addressed This Visit             " ICD-10-CM    Class 2 obesity without serious comorbidity with body mass index (BMI) of 38.0 to 38.9 in adult - Primary E66.9, Z68.38    Elevated glucose R73.09    Relevant Orders    Hemoglobin A1C    Mixed hyperlipidemia E78.2    Relevant Orders    Lipid Panel (Completed)    Other fatigue R53.83    Relevant Orders    TSH with reflex to Free T4 if abnormal (Completed)    Vitamin B12 (Completed)    Vitamin D deficiency E55.9    Relevant Orders    Vitamin D 25-Hydroxy,Total (for eval of Vitamin D levels) (Completed)

## 2024-04-11 ENCOUNTER — LAB (OUTPATIENT)
Dept: LAB | Facility: LAB | Age: 34
End: 2024-04-11
Payer: COMMERCIAL

## 2024-04-11 DIAGNOSIS — R53.83 OTHER FATIGUE: ICD-10-CM

## 2024-04-11 DIAGNOSIS — R73.09 ELEVATED GLUCOSE: ICD-10-CM

## 2024-04-11 DIAGNOSIS — E61.1 IRON DEFICIENCY: ICD-10-CM

## 2024-04-11 DIAGNOSIS — E78.2 MIXED HYPERLIPIDEMIA: ICD-10-CM

## 2024-04-11 DIAGNOSIS — E55.9 VITAMIN D DEFICIENCY: ICD-10-CM

## 2024-04-11 LAB
25(OH)D3 SERPL-MCNC: 26 NG/ML (ref 30–100)
ALBUMIN SERPL BCP-MCNC: 4.1 G/DL (ref 3.4–5)
ALP SERPL-CCNC: 56 U/L (ref 33–110)
ALT SERPL W P-5'-P-CCNC: 25 U/L (ref 7–45)
ANION GAP SERPL CALC-SCNC: 15 MMOL/L (ref 10–20)
AST SERPL W P-5'-P-CCNC: 21 U/L (ref 9–39)
BASOPHILS # BLD AUTO: 0.06 X10*3/UL (ref 0–0.1)
BASOPHILS NFR BLD AUTO: 1.1 %
BILIRUB SERPL-MCNC: 0.6 MG/DL (ref 0–1.2)
BUN SERPL-MCNC: 14 MG/DL (ref 6–23)
CALCIUM SERPL-MCNC: 9.8 MG/DL (ref 8.6–10.6)
CHLORIDE SERPL-SCNC: 106 MMOL/L (ref 98–107)
CHOLEST SERPL-MCNC: 170 MG/DL (ref 0–199)
CHOLESTEROL/HDL RATIO: 3.9
CO2 SERPL-SCNC: 23 MMOL/L (ref 21–32)
CREAT SERPL-MCNC: 0.67 MG/DL (ref 0.5–1.05)
EGFRCR SERPLBLD CKD-EPI 2021: >90 ML/MIN/1.73M*2
EOSINOPHIL # BLD AUTO: 0.2 X10*3/UL (ref 0–0.7)
EOSINOPHIL NFR BLD AUTO: 3.6 %
ERYTHROCYTE [DISTWIDTH] IN BLOOD BY AUTOMATED COUNT: 12.3 % (ref 11.5–14.5)
FERRITIN SERPL-MCNC: 78 NG/ML (ref 8–150)
GLUCOSE SERPL-MCNC: 86 MG/DL (ref 74–99)
HCT VFR BLD AUTO: 37.7 % (ref 36–46)
HDLC SERPL-MCNC: 43.5 MG/DL
HGB BLD-MCNC: 13.1 G/DL (ref 12–16)
IMM GRANULOCYTES # BLD AUTO: 0.02 X10*3/UL (ref 0–0.7)
IMM GRANULOCYTES NFR BLD AUTO: 0.4 % (ref 0–0.9)
IRON SATN MFR SERPL: 33 % (ref 25–45)
IRON SERPL-MCNC: 106 UG/DL (ref 35–150)
LDLC SERPL CALC-MCNC: 112 MG/DL
LYMPHOCYTES # BLD AUTO: 2.48 X10*3/UL (ref 1.2–4.8)
LYMPHOCYTES NFR BLD AUTO: 45.2 %
MCH RBC QN AUTO: 31.1 PG (ref 26–34)
MCHC RBC AUTO-ENTMCNC: 34.7 G/DL (ref 32–36)
MCV RBC AUTO: 90 FL (ref 80–100)
MONOCYTES # BLD AUTO: 0.48 X10*3/UL (ref 0.1–1)
MONOCYTES NFR BLD AUTO: 8.7 %
NEUTROPHILS # BLD AUTO: 2.25 X10*3/UL (ref 1.2–7.7)
NEUTROPHILS NFR BLD AUTO: 41 %
NON HDL CHOLESTEROL: 127 MG/DL (ref 0–149)
NRBC BLD-RTO: 0 /100 WBCS (ref 0–0)
PLATELET # BLD AUTO: 444 X10*3/UL (ref 150–450)
POTASSIUM SERPL-SCNC: 4.3 MMOL/L (ref 3.5–5.3)
PROT SERPL-MCNC: 7 G/DL (ref 6.4–8.2)
RBC # BLD AUTO: 4.21 X10*6/UL (ref 4–5.2)
SODIUM SERPL-SCNC: 140 MMOL/L (ref 136–145)
TIBC SERPL-MCNC: 325 UG/DL (ref 240–445)
TRIGL SERPL-MCNC: 71 MG/DL (ref 0–149)
TSH SERPL-ACNC: 0.65 MIU/L (ref 0.44–3.98)
UIBC SERPL-MCNC: 219 UG/DL (ref 110–370)
VIT B12 SERPL-MCNC: 625 PG/ML (ref 211–911)
VLDL: 14 MG/DL (ref 0–40)
WBC # BLD AUTO: 5.5 X10*3/UL (ref 4.4–11.3)

## 2024-04-11 PROCEDURE — 82607 VITAMIN B-12: CPT

## 2024-04-11 PROCEDURE — 36415 COLL VENOUS BLD VENIPUNCTURE: CPT

## 2024-04-11 PROCEDURE — 82728 ASSAY OF FERRITIN: CPT

## 2024-04-11 PROCEDURE — 84443 ASSAY THYROID STIM HORMONE: CPT

## 2024-04-11 PROCEDURE — 80061 LIPID PANEL: CPT

## 2024-04-11 PROCEDURE — 83540 ASSAY OF IRON: CPT

## 2024-04-11 PROCEDURE — 82306 VITAMIN D 25 HYDROXY: CPT

## 2024-04-11 PROCEDURE — 85025 COMPLETE CBC W/AUTO DIFF WBC: CPT

## 2024-04-11 PROCEDURE — 83550 IRON BINDING TEST: CPT

## 2024-04-11 PROCEDURE — 80053 COMPREHEN METABOLIC PANEL: CPT

## 2024-04-11 PROCEDURE — 83036 HEMOGLOBIN GLYCOSYLATED A1C: CPT

## 2024-04-14 PROBLEM — R73.09 ELEVATED GLUCOSE: Status: ACTIVE | Noted: 2024-04-14

## 2024-04-14 PROBLEM — E66.9 CLASS 2 OBESITY WITHOUT SERIOUS COMORBIDITY WITH BODY MASS INDEX (BMI) OF 38.0 TO 38.9 IN ADULT: Status: ACTIVE | Noted: 2017-08-02

## 2024-04-14 PROBLEM — E66.812 CLASS 2 OBESITY WITHOUT SERIOUS COMORBIDITY WITH BODY MASS INDEX (BMI) OF 38.0 TO 38.9 IN ADULT: Status: ACTIVE | Noted: 2017-08-02

## 2024-04-14 PROBLEM — R53.83 OTHER FATIGUE: Status: ACTIVE | Noted: 2024-04-14

## 2024-04-14 PROBLEM — E78.2 MIXED HYPERLIPIDEMIA: Status: ACTIVE | Noted: 2024-04-14

## 2024-04-14 ASSESSMENT — ENCOUNTER SYMPTOMS
COLOR CHANGE: 0
ACTIVITY CHANGE: 1
UNEXPECTED WEIGHT CHANGE: 1

## 2024-04-15 ENCOUNTER — APPOINTMENT (OUTPATIENT)
Dept: NUTRITION | Facility: CLINIC | Age: 34
End: 2024-04-15
Payer: COMMERCIAL

## 2024-04-15 LAB
EST. AVERAGE GLUCOSE BLD GHB EST-MCNC: 94 MG/DL
HBA1C MFR BLD: 4.9 %

## 2024-04-21 DIAGNOSIS — E66.09 CLASS 2 OBESITY DUE TO EXCESS CALORIES WITH BODY MASS INDEX (BMI) OF 38.0 TO 38.9 IN ADULT, UNSPECIFIED WHETHER SERIOUS COMORBIDITY PRESENT: Primary | ICD-10-CM

## 2024-05-14 ENCOUNTER — TELEPHONE (OUTPATIENT)
Dept: ORTHOPEDIC SURGERY | Facility: CLINIC | Age: 34
End: 2024-05-14
Payer: COMMERCIAL

## 2024-05-14 NOTE — TELEPHONE ENCOUNTER
Spoke with patient and confirmed surgery with Dr. Sanders on 05/23/24. Also reminded patient to bring sling that was given to her at last office visit.

## 2024-05-17 ENCOUNTER — PRE-ADMISSION TESTING (OUTPATIENT)
Dept: PREADMISSION TESTING | Facility: HOSPITAL | Age: 34
End: 2024-05-17
Payer: COMMERCIAL

## 2024-05-17 VITALS
HEART RATE: 80 BPM | TEMPERATURE: 97 F | BODY MASS INDEX: 38.57 KG/M2 | RESPIRATION RATE: 18 BRPM | SYSTOLIC BLOOD PRESSURE: 108 MMHG | WEIGHT: 231.48 LBS | DIASTOLIC BLOOD PRESSURE: 72 MMHG | OXYGEN SATURATION: 99 % | HEIGHT: 65 IN

## 2024-05-17 DIAGNOSIS — Z01.818 PREOPERATIVE EXAMINATION: Primary | ICD-10-CM

## 2024-05-17 LAB
ANION GAP SERPL CALC-SCNC: 12 MMOL/L (ref 10–20)
BUN SERPL-MCNC: 14 MG/DL (ref 6–23)
CALCIUM SERPL-MCNC: 9.5 MG/DL (ref 8.6–10.3)
CHLORIDE SERPL-SCNC: 104 MMOL/L (ref 98–107)
CO2 SERPL-SCNC: 26 MMOL/L (ref 21–32)
CREAT SERPL-MCNC: 0.6 MG/DL (ref 0.5–1.05)
EGFRCR SERPLBLD CKD-EPI 2021: >90 ML/MIN/1.73M*2
ERYTHROCYTE [DISTWIDTH] IN BLOOD BY AUTOMATED COUNT: 12.4 % (ref 11.5–14.5)
GLUCOSE SERPL-MCNC: 97 MG/DL (ref 74–99)
HCT VFR BLD AUTO: 38.1 % (ref 36–46)
HGB BLD-MCNC: 12.7 G/DL (ref 12–16)
MCH RBC QN AUTO: 30.2 PG (ref 26–34)
MCHC RBC AUTO-ENTMCNC: 33.3 G/DL (ref 32–36)
MCV RBC AUTO: 91 FL (ref 80–100)
NRBC BLD-RTO: 0 /100 WBCS (ref 0–0)
PLATELET # BLD AUTO: 428 X10*3/UL (ref 150–450)
POTASSIUM SERPL-SCNC: 4.2 MMOL/L (ref 3.5–5.3)
RBC # BLD AUTO: 4.2 X10*6/UL (ref 4–5.2)
SODIUM SERPL-SCNC: 138 MMOL/L (ref 136–145)
WBC # BLD AUTO: 6.5 X10*3/UL (ref 4.4–11.3)

## 2024-05-17 PROCEDURE — 82374 ASSAY BLOOD CARBON DIOXIDE: CPT

## 2024-05-17 PROCEDURE — 99203 OFFICE O/P NEW LOW 30 MIN: CPT | Performed by: NURSE PRACTITIONER

## 2024-05-17 PROCEDURE — 36415 COLL VENOUS BLD VENIPUNCTURE: CPT

## 2024-05-17 PROCEDURE — 85027 COMPLETE CBC AUTOMATED: CPT

## 2024-05-17 RX ORDER — NAPROXEN 250 MG/1
250 TABLET ORAL
COMMUNITY
End: 2024-05-17 | Stop reason: WASHOUT

## 2024-05-17 RX ORDER — FERROUS SULFATE, DRIED 160(50) MG
1 TABLET, EXTENDED RELEASE ORAL DAILY
COMMUNITY

## 2024-05-17 ASSESSMENT — LIFESTYLE VARIABLES: SMOKING_STATUS: NONSMOKER

## 2024-05-17 ASSESSMENT — DUKE ACTIVITY SCORE INDEX (DASI)
CAN YOU PARTICIPATE IN STRENOUS SPORTS LIKE SWIMMING, SINGLES TENNIS, FOOTBALL, BASKETBALL, OR SKIING: NO
CAN YOU DO LIGHT WORK AROUND THE HOUSE LIKE DUSTING OR WASHING DISHES: YES
DASI METS SCORE: 8.2
CAN YOU WALK INDOORS, SUCH AS AROUND YOUR HOUSE: YES
CAN YOU DO MODERATE WORK AROUND THE HOUSE LIKE VACUUMING, SWEEPING FLOORS OR CARRYING GROCERIES: YES
CAN YOU HAVE SEXUAL RELATIONS: YES
CAN YOU RUN A SHORT DISTANCE: YES
CAN YOU PARTICIPATE IN MODERATE RECREATIONAL ACTIVITIES LIKE GOLF, BOWLING, DANCING, DOUBLES TENNIS OR THROWING A BASEBALL OR FOOTBALL: NO
TOTAL_SCORE: 44.7
CAN YOU WALK A BLOCK OR TWO ON LEVEL GROUND: YES
CAN YOU CLIMB A FLIGHT OF STAIRS OR WALK UP A HILL: YES
CAN YOU DO HEAVY WORK AROUND THE HOUSE LIKE SCRUBBING FLOORS OR LIFTING AND MOVING HEAVY FURNITURE: YES
CAN YOU DO YARD WORK LIKE RAKING LEAVES, WEEDING OR PUSHING A MOWER: YES
CAN YOU TAKE CARE OF YOURSELF (EAT, DRESS, BATHE, OR USE TOILET): YES

## 2024-05-17 ASSESSMENT — ENCOUNTER SYMPTOMS
NECK NEGATIVE: 1
LIMITED RANGE OF MOTION: 1
CONSTITUTIONAL NEGATIVE: 1
GASTROINTESTINAL NEGATIVE: 1
NEUROLOGICAL NEGATIVE: 1
CARDIOVASCULAR NEGATIVE: 1
EYES NEGATIVE: 1
RESPIRATORY NEGATIVE: 1

## 2024-05-17 NOTE — PREPROCEDURE INSTRUCTIONS
Thank you for visiting Preadmission Testing (PAT) today for your pre-procedure evaluation, you were seen by:    Nakita Gonzáles CNP  Pre Admission Testing  [facility]  [phone]    This summary includes instructions and information to aid you during your perioperative period.  Please read carefully. If you have any questions about your visit today, please call the number listed above.  If you become ill or have any changes to your health before your surgery, please contact your primary care provider and alert your surgeon.    Preparing for your Surgery       Exercises  Preoperative Deep Breathing Exercises  Why it is important to do deep breathing exercises before my surgery?  Deep breathing exercises strengthen your breathing muscles.  This helps you to recover after your surgery and decreases the chance of breathing complications.  How are the deep breathing exercises done?  Sit straight with your back supported.  Breathe in deeply and slowly through your nose. Your lower rib cage should expand and your abdomen may move forward.  Hold that breath for 3 to 5 seconds.  Breathe out through pursed lips, slowly and completely.  Rest and repeat 10 times every hour while awake.  Rest longer if you become dizzy or lightheaded.      Preoperative Brain Exercises    What are brain exercises?  A brain exercise is any activity that engages your thinking (cognitive) skills.    What types of activities are considered brain exercises?  Jigsaw puzzles, crossword puzzles, word jumble, memory games, word search, and many more.  Many can be found free online or on your phone via a mobile mahendra.    Why should I do brain exercises before my surgery?  More recent research has shown brain exercise before surgery can lower the risk of postoperative delirium (confusion) which can be especially important for older adults.  Patients who did brain exercises for 5 to 10 hours the days before surgery, cut their risk of  postoperative delirium in half up to 1 week after surgery.    Sit-to-Stand Exercise    What is the sit-to-stand exercise?  The sit-to-stand exercise strengthens the muscles of your lower body and muscles in the center of your body (core muscles for stability) helping to maintain and improve your strength and mobility.  How do I do the sit-to-stand exercise?  The goal is to do this exercise without using your arms or hands.  If this is too difficult, use your arms and hands or a chair with armrests to help slowly push yourself to the standing position and lower yourself back to the sitting position. As the movement becomes easier use your arms and hands less.    Steps to the sit-to-stand exercise  Sit up tall in a sturdy chair, knees bent, feet flat on the floor shoulder-width apart.  Shift your hips/pelvis forward in the chair to correctly position yourself for the next movement.  Lean forward at your hips.  Stand up straight putting equal weight on both feet.  Check to be sure you are properly aligned with the chair, in a slow controlled movement sit back down.  Repeat this exercise 10-15 times.  If needed you can do it fewer times until your strength improves.  Rest for 1 minute.  Do another 10-15 sit-to-stand exercises.  Try to do this in the morning and evening.        Instructions    Preoperative Fasting Guidelines    Why must I stop eating and drinking near surgery time?  With sedation, food or liquid in your stomach can enter your lungs causing serious complications  Food can increase nausea and vomiting  When do I need to stop eating and drinking before my surgery?      Do not eat any food after midnight the night before your surgery/procedure. You may have up to 13.5 ounces of clear liquid until TWO hours before your instructed arrival time to the hospital.  This includes water, black tea/coffee, (no milk or cream) apple juice, and electrolyte drinks (Gatorade). You may chew gum until TWO hours before your  surgery/procedure            Simple things you can do to help prevent blood clots     Blood clots are blockages that can form in the body's veins. When a blood clot forms in your deep veins, it may be called a deep vein thrombosis, or DVT for short. Blood clots can happen in any part of the body where blood flows, but they are most common in the arms and legs. If a piece of a blood clot breaks free and travels to the lungs, it is called a pulmonary embolus (PE). A PE can be a very serious problem.         Being in the hospital or having surgery can raise your chances of getting a blood clot because you may not be well enough to move around as much as you normally do.         Ways you can help prevent blood clots in the hospital       Wearing SCDs  SCDs stands for Sequential Compression Devices.   SCDs are special sleeves that wrap around your legs. They attach to a pump that fills them with air to gently squeeze your legs every few minutes.  This helps return the blood in your legs to your heart.   SCDs should only be taken off when walking or bathing. SCDs may not be comfortable, but they can help save your life.              Pump SCD leg sleeves  Wearing compression stockings - if your doctor orders them. These special snug-fitting stockings gently squeeze your legs to help blood flow.       Walking. Walking helps move the blood in your legs.   If your doctor says it is ok, try walking the halls at least   5 times a day. Ask us to help you get up, so you don't fall.      Taking any blood-thinning medicines your doctor orders.              Ways you can help prevent blood clots at home         Wearing compression stockings - if your doctor orders them.   Walking - to help move the blood in your legs.    Taking any blood-thinning medicines your doctor orders.      Signs of a blood clot or PE    Tell your doctor or nurse right away if you have any of the problems listed below.         If you are at home, seek medical  care right away. Call 911 for chest pain or problems breathing.            Signs of a blood clot (DVT) - such as pain, swelling, redness, or warmth in your arm or legs.  Signs of a pulmonary embolism (PE) - such as chest pain or feeling short of breath      Tobacco and Alcohol;  Do not drink alcohol or smoke within 24 hours of surgery.  It is best to quit smoking for as long as possible before any surgery or procedure.      The Week before Surgery        Seven days before Surgery  Check your PAT medication instructions  Do the exercises provided to you by PAT  Arrange for a responsible, adult licensed  to take you home after surgery and stay with you for 24 hours.  You will not be permitted to drive yourself home if you have received any anesthetic/sedation  Six days before surgery  Check your PAT medication instructions  Do the exercises provided to you by PAT  Start using Chlorhexidene (CHG) body wash if prescribed  Five days before surgery  Check your PAT medication instructions  Do the exercises provided to you by PAT  Continue to use CHG body wash if prescribed  Three days before surgery  Check your PAT medication instructions  Do the exercises provided to you by PAT  Continue to use CHG body wash if prescribed  Two days before surgery  Check your PAT medication instructions  Do the exercises provided to you by PAT  Continue to use CHG body wash if prescribed    The Day before Surgery       Check your PAT medication and all other PAT instructions including when to stop eating and drinking  You will be called with your arrival time for surgery in the late afternoon.  If you do not receive a call please reach out to your surgeon's office.  Do not smoke or drink 24 hours before surgery  Prepare items to bring with you to the hospital  Shower with your chlorhexidine wash if prescribed  Brush your teeth and use your chlorhexidine dental rinse if prescribed    The Day of Surgery       Check your PAT medication  instructions  Ensure you follow the instructions for when to stop eating and drinking  Shower, if prescribed use CHG.  Do not apply any lotions, creams, moisturizers, perfume or deodorant  Brush your teeth and use your CHG dental rinse if prescribed  Wear loose comfortable clothing  Avoid make-up  Remove  jewelry and piercings, consider professional piercing removal with a plastic spacer if needed  Bring photo ID and Insurance card  Bring an accurate medication list that includes medication dose, frequency and allergies  Bring a copy of your advanced directives (will, health care power of )  Bring any devices and controllers as well as medical devices you have been provided with for surgery (CPAP, slings, braces, etc.)  Dentures, eyeglasses, and contacts will be removed before surgery, please bring cases for contacts or glasses

## 2024-05-17 NOTE — CPM/PAT H&P
CPM/PAT Evaluation       Name: Adrianne Rodríguez (Adrianne Rodríguez)  /Age: 1990/33 y.o.     Visit Type:   In-Person       Chief Complaint: Impingement of right shoulder    HPI 34 y/o female scheduled for Arthroscopy - Right shoulder  on 2024  with  Dr. Sanders secondary to impingment of right shoulder, bicep tendinitis - right.  PMHX includes shoulder impingement - Right, obesity.  PAT is consulted today for perioperative risk stratification and optimization.      Past Medical History:   Diagnosis Date    14 weeks gestation of pregnancy ([facility]) 2021    14 weeks gestation of pregnancy    16 weeks gestation of pregnancy ([facility]) 2021    16 weeks gestation of pregnancy    20 weeks gestation of pregnancy ([facility]) 2021    20 weeks gestation of pregnancy    24 weeks gestation of pregnancy ([facility]) 2021    24 weeks gestation of pregnancy    28 weeks gestation of pregnancy ([facility]) 2021    28 weeks gestation of pregnancy    33 weeks gestation of pregnancy ([facility]) 10/13/2021    33 weeks gestation of pregnancy    35 weeks gestation of pregnancy ([facility]) 10/28/2021    35 weeks gestation of pregnancy    Abnormal findings on diagnostic imaging of other specified body structures 2020    Abnormal CAT scan    Abnormal level of blood mineral 2020    Low iron stores    Acute bronchospasm 2019    Acute bronchospasm    Body mass index (BMI)40.0-44.9, adult 02/10/2021    Body mass index (BMI) of 40.0 to 44.9 in adult    Encounter for immunization 10/13/2021    Need for Tdap vaccination    Encounter for pregnancy test, result positive ([facility]) 2021    Positive urine pregnancy test    Encounter for routine postpartum follow-up ([facility]) 12/15/2021    Encounter for postpartum visit    Encounter for supervision of other normal pregnancy, third trimester ([facility]) 10/28/2021    Encounter for supervision of normal pregnancy in multigravida in third trimester     Inappropriate diet and eating habits 2020    Inappropriate diet and eating habits    Major depressive disorder, recurrent, unspecified (CMS-HCC) 01/15/2019    Major depressive disorder, recurrent episode with anxious distress    Mastodynia 2020    Pain of left breast    Nonpurulent mastitis associated with the puerperium ([facility]) 2020    Mastitis, postpartum    Obesity, unspecified 2019    Obesity (BMI 30-39.9)    Other allergy status, other than to drugs and biological substances 2020    History of environmental allergies    Other conditions influencing health status 2021    History of pregnancy    Other malaise 2020    Malaise and fatigue    Other specified abnormal findings of blood chemistry 2020    Abnormal TSH    Pain in right knee     Bilateral knee pain    Pain in right knee 2021    Knee pain, right    Pain in unspecified hip 2019    Hip pain    Papillomavirus as the cause of diseases classified elsewhere     HPV in female    Personal history of other complications of pregnancy, childbirth and the puerperium 2021    History of threatened     Personal history of other diseases of the female genital tract     History of abnormal cervical Papanicolaou smear    Personal history of other drug therapy 10/13/2021    History of influenza vaccination    Personal history of other specified conditions 2020    History of syncope    Personal history of other specified conditions 02/10/2021    History of flank pain    Personal history of other specified conditions 2022    History of vertigo    Personal history of other specified conditions 2019    History of palpitations    RLS (restless legs syndrome)     Scotoma involving central area, unspecified eye 2020    Visual field scotoma    Secondary amenorrhea 2021    Amenorrhea, secondary    Unspecified condition associated with female genital organs and menstrual cycle  2022    Vaginal discomfort    Unspecified visual disturbance 2020    Vision disturbance    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    Family History   Problem Relation Name Age of Onset    Amblyopia Mother      Depression Mother      Other (Substance Use Disorder) Mother      Other (Substance Use Disorder) Brother      Breast cancer Mother's Sister      Brain cancer Mother's Sister      Bipolar disorder Father's Sister      Breast cancer Paternal Grandmother         No Known Allergies    Prior to Admission medications    Medication Sig Start Date End Date Taking? Authorizing Provider   ferrous sulfate 300 mg (60 mg iron)/5 mL syrup Take 5 mL (60 mg of iron) by mouth once daily.    Historical Provider, MD   fluticasone (Flonase) 50 mcg/actuation nasal spray Administer 2 sprays into each nostril once daily.    Historical Provider, MD   loratadine (Claritin) 10 mg tablet Take 1 tablet (10 mg) by mouth once daily.    Historical Provider, MD   omeprazole OTC (PriLOSEC OTC) 20 mg EC tablet Take 1 tablet (20 mg) by mouth once daily in the morning. Take before meals. Do not crush, chew, or split.    Historical Provider, MD TELLEZ ROS:   Constitutional:   neg    Neuro/Psych:   neg    Eyes:   neg    Ears:   neg    Nose:   Mouth:   Throat:   neg    Neck:   neg    Cardio:   neg    Respiratory:   neg    Endocrine:   GI:   neg    :   neg    Musculoskeletal:    6-7/10  pain   decreased ROM  Hematologic:   Skin:      Physical Exam  Constitutional:       Appearance: Normal appearance.   HENT:      Head: Normocephalic and atraumatic.      Mouth/Throat:      Mouth: Mucous membranes are dry.      Pharynx: Oropharynx is clear.   Eyes:      Extraocular Movements: Extraocular movements intact.      Pupils: Pupils are equal, round, and reactive to light.   Cardiovascular:      Rate and Rhythm: Normal rate and regular rhythm.   Pulmonary:      Effort: Pulmonary effort is normal.      Breath sounds: Normal breath sounds.   Abdominal:      General: Abdomen is flat.      Palpations: Abdomen is soft.   Musculoskeletal:      Cervical back: Normal range of motion and neck supple.   Skin:     General: Skin is warm and dry.   Neurological:      General: No focal deficit present.      Mental Status: She is alert and oriented to person, place, and time.   Psychiatric:         Mood and Affect: Mood normal.         Behavior: Behavior normal.          Airway    Visit Vitals  /72   Pulse 80   Temp 36.1 °C (97 °F) (Temporal)   Resp 18       DASI Risk Score      Flowsheet Row Most Recent Value   DASI SCORE 44.7   METS Score (Will be calculated only when all the questions are answered) 8.2          Caprini DVT Assessment      Flowsheet Row Most Recent Value   DVT Score 3   Age Less than 40 years   BMI 31-40 (Obesity)          Modified Frailty Index    No data to display       CHADS2 Stroke Risk  Current as of just now        N/A 3 to 100%: High Risk   2 to < 3%: Medium Risk   0 to < 2%: Low Risk     Last Change: N/A          This score determines the patient's risk of having a stroke if the patient has atrial fibrillation.        This score is not applicable to this patient. Components are not calculated.          Revised Cardiac Risk Index      Flowsheet Row Most Recent Value   Revised Cardiac Risk Calculator 0          Apfel Simplified Score      Flowsheet Row Most Recent Value   Apfel Simplified  Score Calculator 3          Risk Analysis Index Results This Encounter         5/17/2024  1233             FELIZ Cancer History: Patient does not indicate history of cancer          Stop Bang Score      Flowsheet Row Most Recent Value   Do you snore loudly? 0   Do you often feel tired or fatigued after your sleep? 0   Has anyone ever observed you stop breathing in your sleep? 0   Do you have or are you being treated for high blood pressure? 0   Recent BMI (Calculated) 38.3   Is BMI greater than 35 kg/m2? 1=Yes   Age older than 50 years old? 0=No   Is your neck circumference greater than 17 inches (Male) or 16 inches (Female)? 0   Gender - Male 0=No   STOP-BANG Total Score 1            Assessment and Plan:     HPI 32 y/o female scheduled for Arthroscopy - Right shoulder  on 5/23/2024  with  Dr. Sanders secondary to impingment of right shoulder, bicep tendinitis - right.  PMHX includes shoulder impingement - Right, obesity.  PAT is consulted today for perioperative risk stratification and optimization.    Neuro:  No neurologic diagnosis or significant findings on chart review, clinical presentation and evaluation.  No grossly apparent neurologic perioperative risk.  Patient is not at increased risk for perioperative CVA      HEENT:  Seasonal Allergies:  Continue Claritin.    Cardiovascular:  No CV diagnosis or significant findings on chart review or clinical presentation and evaluation. No further preoperative testing or intervention is indicated at this time.  METS: 8.2  RCRI: 0 points, 3.9%  risk for postoperative MACE   ELOISA: 0.0% risk for 30 day postoperative MACE  EKG -     Pulmonary:  No pulmonary diagnosis or significant findings on chart review or clinical presentation.  No further preoperative testing is indicated at this time.    Stop Bang score is 1 placing patient at low risk for ISHAN  ARISCAT: <26 points, 1.6% risk of in-hospital postoperative pulmonary complication  PRODIGY: Low risk for opioid induced  respiratory depression      Renal:   No renal diagnosis, however patient is at increase risk for perioperative renal complications secondary to BMI equal to or greater than 30    Endocrine:  No endocrine diagnosis or significant findings on chart review or clinical presentation and evaluation. No further testing or intervention is indicated at this time.    Hematologic:  No hematologic diagnosis, however patient is at an increased risk for DVT    Caprini Score 3, patient at High risk for perioperative DVT.  Patient provided with VTE education/handout.    Gastrointestinal:   No GI diagnosis or significant findings on chart review or clinical presentation and evaluation.   Apfel 3    Infectious disease:   No infectious diagnosis or significant findings on chart review or clinical presentation and evaluation.       Musculoskeletal:   Follows with Dr. Sanders and last seen 3/15/2024 for continued right shoulder pain that has failed non-operative treatment -   Plan is to have Acromioplasty Arthroscopy on right shoulder on 5/23/2024  Instructed to stop taking NSAIDS - last dose 5/16/2024    Other:  Instructed to stop taking Vitamin D - last dose 5/16/2024    Anesthesia/Airway:  No anesthesia complications      Medication instructions and NPO guidelines reviewed with the patient.  All questions or concerns discussed and addressed.      Labs and EKG ordered   CBC and BMP

## 2024-05-22 ENCOUNTER — ANESTHESIA EVENT (OUTPATIENT)
Dept: OPERATING ROOM | Facility: HOSPITAL | Age: 34
End: 2024-05-22
Payer: COMMERCIAL

## 2024-05-22 ENCOUNTER — TELEPHONE (OUTPATIENT)
Dept: ORTHOPEDIC SURGERY | Facility: CLINIC | Age: 34
End: 2024-05-22
Payer: COMMERCIAL

## 2024-05-23 ENCOUNTER — PHARMACY VISIT (OUTPATIENT)
Dept: PHARMACY | Facility: CLINIC | Age: 34
End: 2024-05-23
Payer: MEDICAID

## 2024-05-23 ENCOUNTER — HOSPITAL ENCOUNTER (OUTPATIENT)
Facility: HOSPITAL | Age: 34
Setting detail: OUTPATIENT SURGERY
Discharge: HOME | End: 2024-05-23
Attending: ORTHOPAEDIC SURGERY | Admitting: ORTHOPAEDIC SURGERY
Payer: COMMERCIAL

## 2024-05-23 ENCOUNTER — ANESTHESIA (OUTPATIENT)
Dept: OPERATING ROOM | Facility: HOSPITAL | Age: 34
End: 2024-05-23
Payer: COMMERCIAL

## 2024-05-23 VITALS
RESPIRATION RATE: 16 BRPM | DIASTOLIC BLOOD PRESSURE: 73 MMHG | SYSTOLIC BLOOD PRESSURE: 120 MMHG | HEIGHT: 65 IN | WEIGHT: 229.28 LBS | OXYGEN SATURATION: 98 % | TEMPERATURE: 97.2 F | HEART RATE: 72 BPM | BODY MASS INDEX: 38.2 KG/M2

## 2024-05-23 DIAGNOSIS — M75.41 IMPINGEMENT SYNDROME OF RIGHT SHOULDER: ICD-10-CM

## 2024-05-23 DIAGNOSIS — M25.511 CHRONIC RIGHT SHOULDER PAIN: ICD-10-CM

## 2024-05-23 DIAGNOSIS — M75.21 BICEPS TENDINITIS OF RIGHT SHOULDER: Primary | ICD-10-CM

## 2024-05-23 DIAGNOSIS — M25.811 OS ACROMIALE OF RIGHT SHOULDER: ICD-10-CM

## 2024-05-23 DIAGNOSIS — S43.431A LABRAL TEAR OF SHOULDER, RIGHT, INITIAL ENCOUNTER: ICD-10-CM

## 2024-05-23 DIAGNOSIS — G89.29 CHRONIC RIGHT SHOULDER PAIN: ICD-10-CM

## 2024-05-23 LAB — PREGNANCY TEST URINE, POC: NEGATIVE

## 2024-05-23 PROCEDURE — 2500000001 HC RX 250 WO HCPCS SELF ADMINISTERED DRUGS (ALT 637 FOR MEDICARE OP): Performed by: NURSE ANESTHETIST, CERTIFIED REGISTERED

## 2024-05-23 PROCEDURE — 2500000004 HC RX 250 GENERAL PHARMACY W/ HCPCS (ALT 636 FOR OP/ED): Performed by: NURSE ANESTHETIST, CERTIFIED REGISTERED

## 2024-05-23 PROCEDURE — 29826 SHO ARTHRS SRG DECOMPRESSION: CPT | Performed by: ORTHOPAEDIC SURGERY

## 2024-05-23 PROCEDURE — 81025 URINE PREGNANCY TEST: CPT | Performed by: ANESTHESIOLOGY

## 2024-05-23 PROCEDURE — 2500000004 HC RX 250 GENERAL PHARMACY W/ HCPCS (ALT 636 FOR OP/ED): Performed by: ANESTHESIOLOGY

## 2024-05-23 PROCEDURE — 3700000001 HC GENERAL ANESTHESIA TIME - INITIAL BASE CHARGE: Performed by: ORTHOPAEDIC SURGERY

## 2024-05-23 PROCEDURE — 3700000002 HC GENERAL ANESTHESIA TIME - EACH INCREMENTAL 1 MINUTE: Performed by: ORTHOPAEDIC SURGERY

## 2024-05-23 PROCEDURE — 29823 SHO ARTHRS SRG XTNSV DBRDMT: CPT | Performed by: ORTHOPAEDIC SURGERY

## 2024-05-23 PROCEDURE — 2500000005 HC RX 250 GENERAL PHARMACY W/O HCPCS: Performed by: NURSE ANESTHETIST, CERTIFIED REGISTERED

## 2024-05-23 PROCEDURE — 29828 SHO ARTHRS SRG BICP TENODSIS: CPT | Performed by: ORTHOPAEDIC SURGERY

## 2024-05-23 PROCEDURE — A29823 PR SHLDR ARTHROSCOP,EXTEN DEBRIDE: Performed by: ANESTHESIOLOGY

## 2024-05-23 PROCEDURE — 7100000010 HC PHASE TWO TIME - EACH INCREMENTAL 1 MINUTE: Performed by: ORTHOPAEDIC SURGERY

## 2024-05-23 PROCEDURE — 3600000009 HC OR TIME - EACH INCREMENTAL 1 MINUTE - PROCEDURE LEVEL FOUR: Performed by: ORTHOPAEDIC SURGERY

## 2024-05-23 PROCEDURE — 7100000009 HC PHASE TWO TIME - INITIAL BASE CHARGE: Performed by: ORTHOPAEDIC SURGERY

## 2024-05-23 PROCEDURE — 2720000007 HC OR 272 NO HCPCS: Performed by: ORTHOPAEDIC SURGERY

## 2024-05-23 PROCEDURE — 7100000002 HC RECOVERY ROOM TIME - EACH INCREMENTAL 1 MINUTE: Performed by: ORTHOPAEDIC SURGERY

## 2024-05-23 PROCEDURE — 2500000004 HC RX 250 GENERAL PHARMACY W/ HCPCS (ALT 636 FOR OP/ED): Performed by: ORTHOPAEDIC SURGERY

## 2024-05-23 PROCEDURE — 64415 NJX AA&/STRD BRCH PLXS IMG: CPT | Performed by: ANESTHESIOLOGY

## 2024-05-23 PROCEDURE — A29823 PR SHLDR ARTHROSCOP,EXTEN DEBRIDE: Performed by: NURSE ANESTHETIST, CERTIFIED REGISTERED

## 2024-05-23 PROCEDURE — 3600000004 HC OR TIME - INITIAL BASE CHARGE - PROCEDURE LEVEL FOUR: Performed by: ORTHOPAEDIC SURGERY

## 2024-05-23 PROCEDURE — RXMED WILLOW AMBULATORY MEDICATION CHARGE

## 2024-05-23 PROCEDURE — C1713 ANCHOR/SCREW BN/BN,TIS/BN: HCPCS | Performed by: ORTHOPAEDIC SURGERY

## 2024-05-23 PROCEDURE — 2780000003 HC OR 278 NO HCPCS: Performed by: ORTHOPAEDIC SURGERY

## 2024-05-23 PROCEDURE — 7100000001 HC RECOVERY ROOM TIME - INITIAL BASE CHARGE: Performed by: ORTHOPAEDIC SURGERY

## 2024-05-23 DEVICE — MILAGRO ADVANCE INTERFERENCE SCREW ABSORBABLE - TCP/PLGA 8 X 23MM
Type: IMPLANTABLE DEVICE | Site: SHOULDER | Status: FUNCTIONAL
Brand: MILAGRO

## 2024-05-23 RX ORDER — ONDANSETRON HYDROCHLORIDE 2 MG/ML
INJECTION, SOLUTION INTRAVENOUS AS NEEDED
Status: DISCONTINUED | OUTPATIENT
Start: 2024-05-23 | End: 2024-05-23

## 2024-05-23 RX ORDER — OXYCODONE HYDROCHLORIDE 5 MG/1
5 TABLET ORAL EVERY 6 HOURS PRN
Qty: 28 TABLET | Refills: 0 | Status: SHIPPED | OUTPATIENT
Start: 2024-05-23 | End: 2024-06-07 | Stop reason: SDUPTHER

## 2024-05-23 RX ORDER — FENTANYL CITRATE 50 UG/ML
INJECTION, SOLUTION INTRAMUSCULAR; INTRAVENOUS AS NEEDED
Status: DISCONTINUED | OUTPATIENT
Start: 2024-05-23 | End: 2024-05-23

## 2024-05-23 RX ORDER — MELOXICAM 15 MG/1
15 TABLET ORAL DAILY PRN
Qty: 30 TABLET | Refills: 0 | Status: SHIPPED | OUTPATIENT
Start: 2024-05-23 | End: 2024-06-22

## 2024-05-23 RX ORDER — DOCUSATE SODIUM 100 MG/1
100 CAPSULE, LIQUID FILLED ORAL 2 TIMES DAILY PRN
Qty: 28 CAPSULE | Refills: 0 | Status: SHIPPED | OUTPATIENT
Start: 2024-05-23 | End: 2024-06-06

## 2024-05-23 RX ORDER — ESMOLOL HYDROCHLORIDE 10 MG/ML
INJECTION INTRAVENOUS AS NEEDED
Status: DISCONTINUED | OUTPATIENT
Start: 2024-05-23 | End: 2024-05-23

## 2024-05-23 RX ORDER — OXYCODONE HYDROCHLORIDE 5 MG/1
5 TABLET ORAL EVERY 4 HOURS PRN
Status: DISCONTINUED | OUTPATIENT
Start: 2024-05-23 | End: 2024-05-23 | Stop reason: HOSPADM

## 2024-05-23 RX ORDER — ACETAMINOPHEN 500 MG
1000 TABLET ORAL EVERY 8 HOURS PRN
Qty: 90 TABLET | Refills: 0 | Status: SHIPPED | OUTPATIENT
Start: 2024-05-23 | End: 2024-05-23 | Stop reason: HOSPADM

## 2024-05-23 RX ORDER — MIDAZOLAM HYDROCHLORIDE 1 MG/ML
INJECTION, SOLUTION INTRAMUSCULAR; INTRAVENOUS AS NEEDED
Status: DISCONTINUED | OUTPATIENT
Start: 2024-05-23 | End: 2024-05-23

## 2024-05-23 RX ORDER — GABAPENTIN 300 MG/1
300 CAPSULE ORAL NIGHTLY
Qty: 5 CAPSULE | Refills: 0 | Status: SHIPPED | OUTPATIENT
Start: 2024-05-23 | End: 2024-05-23 | Stop reason: HOSPADM

## 2024-05-23 RX ORDER — LIDOCAINE HCL/PF 100 MG/5ML
SYRINGE (ML) INTRAVENOUS AS NEEDED
Status: DISCONTINUED | OUTPATIENT
Start: 2024-05-23 | End: 2024-05-23

## 2024-05-23 RX ORDER — ACETAMINOPHEN 500 MG
1000 TABLET ORAL EVERY 8 HOURS PRN
Qty: 90 TABLET | Refills: 0 | Status: SHIPPED | OUTPATIENT
Start: 2024-05-23

## 2024-05-23 RX ORDER — ONDANSETRON HYDROCHLORIDE 2 MG/ML
4 INJECTION, SOLUTION INTRAVENOUS ONCE AS NEEDED
Status: DISCONTINUED | OUTPATIENT
Start: 2024-05-23 | End: 2024-05-23 | Stop reason: HOSPADM

## 2024-05-23 RX ORDER — CEFAZOLIN 1 G/1
INJECTION, POWDER, FOR SOLUTION INTRAVENOUS AS NEEDED
Status: DISCONTINUED | OUTPATIENT
Start: 2024-05-23 | End: 2024-05-23

## 2024-05-23 RX ORDER — OXYCODONE HYDROCHLORIDE 5 MG/1
5 TABLET ORAL EVERY 6 HOURS PRN
Qty: 28 TABLET | Refills: 0 | Status: SHIPPED | OUTPATIENT
Start: 2024-05-23 | End: 2024-05-23 | Stop reason: HOSPADM

## 2024-05-23 RX ORDER — ASPIRIN 325 MG
325 TABLET, DELAYED RELEASE (ENTERIC COATED) ORAL 2 TIMES DAILY
Qty: 60 TABLET | Refills: 0 | Status: SHIPPED | OUTPATIENT
Start: 2024-05-23 | End: 2024-05-23 | Stop reason: HOSPADM

## 2024-05-23 RX ORDER — DROPERIDOL 2.5 MG/ML
0.62 INJECTION, SOLUTION INTRAMUSCULAR; INTRAVENOUS ONCE AS NEEDED
Status: COMPLETED | OUTPATIENT
Start: 2024-05-23 | End: 2024-05-23

## 2024-05-23 RX ORDER — LIDOCAINE HYDROCHLORIDE 40 MG/ML
SOLUTION TOPICAL AS NEEDED
Status: DISCONTINUED | OUTPATIENT
Start: 2024-05-23 | End: 2024-05-23

## 2024-05-23 RX ORDER — GABAPENTIN 300 MG/1
300 CAPSULE ORAL NIGHTLY
Qty: 5 CAPSULE | Refills: 0 | Status: SHIPPED | OUTPATIENT
Start: 2024-05-23 | End: 2024-05-28

## 2024-05-23 RX ORDER — ASPIRIN 325 MG
325 TABLET, DELAYED RELEASE (ENTERIC COATED) ORAL 2 TIMES DAILY
Qty: 60 TABLET | Refills: 0 | Status: SHIPPED | OUTPATIENT
Start: 2024-05-23 | End: 2024-06-22

## 2024-05-23 RX ORDER — PROPOFOL 10 MG/ML
INJECTION, EMULSION INTRAVENOUS AS NEEDED
Status: DISCONTINUED | OUTPATIENT
Start: 2024-05-23 | End: 2024-05-23

## 2024-05-23 RX ORDER — DOCUSATE SODIUM 100 MG/1
100 CAPSULE, LIQUID FILLED ORAL 2 TIMES DAILY PRN
Qty: 28 CAPSULE | Refills: 0 | Status: SHIPPED | OUTPATIENT
Start: 2024-05-23 | End: 2024-05-23 | Stop reason: HOSPADM

## 2024-05-23 RX ORDER — MELOXICAM 15 MG/1
15 TABLET ORAL DAILY PRN
Qty: 30 TABLET | Refills: 0 | Status: SHIPPED | OUTPATIENT
Start: 2024-05-23 | End: 2024-05-23 | Stop reason: HOSPADM

## 2024-05-23 RX ORDER — SODIUM CHLORIDE, SODIUM LACTATE, POTASSIUM CHLORIDE, CALCIUM CHLORIDE 600; 310; 30; 20 MG/100ML; MG/100ML; MG/100ML; MG/100ML
100 INJECTION, SOLUTION INTRAVENOUS CONTINUOUS
Status: DISCONTINUED | OUTPATIENT
Start: 2024-05-23 | End: 2024-05-23 | Stop reason: HOSPADM

## 2024-05-23 RX ORDER — ROCURONIUM BROMIDE 10 MG/ML
INJECTION, SOLUTION INTRAVENOUS AS NEEDED
Status: DISCONTINUED | OUTPATIENT
Start: 2024-05-23 | End: 2024-05-23

## 2024-05-23 RX ADMIN — SUGAMMADEX 200 MG: 100 INJECTION, SOLUTION INTRAVENOUS at 15:38

## 2024-05-23 RX ADMIN — CEFAZOLIN 3 G: 1 INJECTION, POWDER, FOR SOLUTION INTRAMUSCULAR; INTRAVENOUS at 14:45

## 2024-05-23 RX ADMIN — FENTANYL CITRATE 100 MCG: 50 INJECTION, SOLUTION INTRAMUSCULAR; INTRAVENOUS at 14:56

## 2024-05-23 RX ADMIN — LIDOCAINE HYDROCHLORIDE 50 MG: 20 INJECTION INTRAVENOUS at 14:26

## 2024-05-23 RX ADMIN — MIDAZOLAM 4 MG: 1 INJECTION INTRAMUSCULAR; INTRAVENOUS at 13:00

## 2024-05-23 RX ADMIN — DEXAMETHASONE SODIUM PHOSPHATE 8 MG: 4 INJECTION INTRA-ARTICULAR; INTRALESIONAL; INTRAMUSCULAR; INTRAVENOUS; SOFT TISSUE at 15:13

## 2024-05-23 RX ADMIN — PROPOFOL 200 MG: 10 INJECTION, EMULSION INTRAVENOUS at 14:26

## 2024-05-23 RX ADMIN — ESMOLOL HYDROCHLORIDE 100 MG: 10 INJECTION, SOLUTION INTRAVENOUS at 14:31

## 2024-05-23 RX ADMIN — LIDOCAINE HYDROCHLORIDE 4 ML: 40 SOLUTION TOPICAL at 14:27

## 2024-05-23 RX ADMIN — ONDANSETRON 4 MG: 2 INJECTION INTRAMUSCULAR; INTRAVENOUS at 15:33

## 2024-05-23 RX ADMIN — SODIUM CHLORIDE, POTASSIUM CHLORIDE, SODIUM LACTATE AND CALCIUM CHLORIDE 100 ML/HR: 600; 310; 30; 20 INJECTION, SOLUTION INTRAVENOUS at 12:09

## 2024-05-23 RX ADMIN — DROPERIDOL 0.62 MG: 2.5 INJECTION, SOLUTION INTRAMUSCULAR; INTRAVENOUS at 16:25

## 2024-05-23 RX ADMIN — ROCURONIUM BROMIDE 60 MG: 10 INJECTION, SOLUTION INTRAVENOUS at 14:27

## 2024-05-23 SDOH — HEALTH STABILITY: MENTAL HEALTH: CURRENT SMOKER: 0

## 2024-05-23 ASSESSMENT — PAIN SCALES - GENERAL
PAIN_LEVEL: 2
PAINLEVEL_OUTOF10: 1
PAINLEVEL_OUTOF10: 3
PAINLEVEL_OUTOF10: 0 - NO PAIN
PAINLEVEL_OUTOF10: 2
PAINLEVEL_OUTOF10: 2

## 2024-05-23 ASSESSMENT — PAIN - FUNCTIONAL ASSESSMENT
PAIN_FUNCTIONAL_ASSESSMENT: 0-10

## 2024-05-23 NOTE — ANESTHESIA POSTPROCEDURE EVALUATION
Patient: Adrianne Rodríguez    Procedure Summary       Date: 05/23/24 Room / Location: [facility] OR 02 / [facility]    Anesthesia Start: 1420 Anesthesia Stop: 1600    Procedure: SHOULDER SCOPE, BICEPS TENODESIS, EXTENSIVE DEBRIDEMENT, SUBACROMIAL DECOMPRESSION (Right: Shoulder) Diagnosis:       Biceps tendinitis of right shoulder      Impingement syndrome of right shoulder      Labral tear of shoulder, right, initial encounter      (Biceps tendinitis of right shoulder [M75.21])      (Impingement syndrome of right shoulder [M75.41])      (Labral tear of shoulder, right, initial encounter [S43.431A])    Surgeons: MCKENZIE Sanders MD Responsible Provider: Macario Kim MD    Anesthesia Type: general, regional ASA Status: 2            Anesthesia Type: general, regional    Vitals Value Taken Time   /67 05/23/24 1640   Temp 36.2 °C (97.2 °F) 05/23/24 1554   Pulse 70 05/23/24 1640   Resp 15 05/23/24 1640   SpO2 97 % 05/23/24 1625       Anesthesia Post Evaluation    Patient location during evaluation: PACU  Patient participation: complete - patient participated  Level of consciousness: awake  Pain score: 2  Pain management: adequate  Multimodal analgesia pain management approach  Airway patency: patent  Two or more strategies used to mitigate risk of obstructive sleep apnea  Cardiovascular status: acceptable  Respiratory status: acceptable  Hydration status: acceptable  Postoperative Nausea and Vomiting: none    There were no known notable events for this encounter.

## 2024-05-23 NOTE — OP NOTE
[facility]   [facility]  [place] [address]  Phone: [phone]  Fax: [phone]                  Perla Lassiter   2017 3:30 PM   Office Visit    Description:  Female : 1955   Provider:  Ric Thomas MD   Department:  [facility]           Reason for Visit     Concerns About Ocular Health           Diagnoses this Visit        Comments    Left orbit trauma, initial encounter    -  Primary     Nuclear sclerosis, bilateral         Vitreous detachment, left         Peripheral retinal hole of both eyes         Refractive error                To Do List           Goals (5 Years of Data)     None      Follow-Up and Disposition     Return in about 7 months (around 2017) for Dr Claudio, as scheduled.      [facility]     [facility] On Call Nurse Care Line -  Assistance  Unless otherwise directed by your provider, please contact [facility], our nurse care line that is available for  assistance.     Registered nurses in the [facility] On Call Center provide: appointment scheduling, clinical advisement, health education, and other advisory services.  Call: [phone] (toll free)               Medications           Message regarding Medications     Verify the changes and/or additions to your medication regime listed below are the same as discussed with your clinician today.  If any of these changes or additions are incorrect, please notify your healthcare provider.             Verify that the below list of medications is an accurate representation of the medications you are currently taking.  If none reported, the list may be blank. If incorrect, please contact your healthcare provider. Carry this list with you in case of emergency.           Current Medications     ketoconazole (NIZORAL) 2 % cream Apply topically 2 (two) times daily.           Clinical Reference Information           Allergies as of 2017     Sulfa (Sulfonamide  SHOULDER SCOPE, BICEPS TENODESIS, EXTENSIVE DEBRIDEMENT, SUBACROMIAL DECOMPRESSION (R) Operative Note     Date: 2024  OR Location: [facility] OR    Name: Adrianne Rodríguez, : 1990, Age: 33 y.o., MRN: [id number], Sex: female    ORTHOPEDIC OPERATIVE REPORT / POST-OP NOTE    SURGEON:  Redd Sanders MD  ASSISTANT(S):    PRE-OPERATIVE DIAGNOSIS: Right shoulder proximal biceps tendinopathy, labral tearing, subacromial impingement, os acromiale  POST-OPERATIVE DIAGNOSIS:  Same  PROCEDURE: Right shoulder arthroscopy, arthroscopic biceps tenodesis, extensive debridement, subacromial decompression with partial acromioplasty  CPT CODE(S):  32157, 20719, 43313  ANESTHESIA:  general + regional  ESTIMATED BLOOD LOSS: Minimal  SPECIMEN:  None  FINDINGS:  Above  COMPLICATIONS:  None  CONDITION:  Stable to the Recovery Room    I, Dr Sanders, was present and scrubbed for the entire surgical procedure, including wound closure.         INDICATION FOR SURGERY:  33-year-old female with longstanding right shoulder pain and weakness.  We treated the patient's shoulder pain and weakness conservatively with anti-inflammatories, physical therapy, and steroid injections.  The patient failed to have significant relief.  X-rays showed a relatively normal shoulder with no significant arthritis of the glenohumeral joint but she did have an os acromiale and MRI showed proximal biceps tendinopathy, labral tearing and subacromial impingement with a prominent and unstable os acromiale.  On physical examination, the patient had pain and weakness with positive impingement signs, and pain over their proximal biceps tendon.  We discussed continuing conservative treatment but this had not been helping and was tried already.  We discussed a shoulder arthroscopy with arthroscopic biceps tenodesis, extensive debridement and subacromial decompression with partial acromioplasty of the undersurface of the os acromiale.  The patient understood all the  Antibiotics)      Immunizations Administered on Date of Encounter - 4/26/2017     None      [facility] Sign-Up     Activating your [facility] account is as easy as 1-2-3!     1) Visit [email or web address], select Sign Up Now, enter this activation code and your date of birth, then select Next.  [email or web address]  Expires: 6/10/2017  5:09 PM      2) Create a username and password to use when you visit [facility] in the future and select a security question in case you lose your password and select Next.    3) Enter your e-mail address and click Sign Up!    Additional Information  If you have questions, please e-mail [email or web address] or call [phone] to talk to our [facility] staff. Remember, [facility] is NOT to be used for urgent needs. For medical emergencies, dial 911.         Language Assistance Services     ATTENTION: Language assistance services are available, free of charge. Please call [phone].      ATENCIÓN: Si habla español, tiene a yeboah disposición servicios gratuitos de asistencia lingüística. Llame al [phone].     CHÚ Ý: N?u b?n nói Ti?ng Vi?t, có các d?ch v? h? tr? ngôn ng? mi?n phí dành cho b?n. G?i s? [phone].         [facility] complies with applicable Federal civil rights laws and does not discriminate on the basis of race, color, national origin, age, disability, or sex.         risks versus benefits of operative and non-operative treatment options.  The risks of shoulder arthroscopy  were discussed, which included but were not limited to: risk of continued pain, risks of infection, bleeding, nerve, artery, or muscle damage, risk of fracture either intra-operatively or post-operatively, risk of need for additional surgery, risk of anesthesia including risks of heart attack, stroke, or even death.  The patient wanted to proceed with surgery and signed appropriate surgical consents.  On the morning of surgery, I signed the patient's operative shoulder the preoperative holding area.      PROCEDURE:  The patient was brought to the operating room after anesthesia performed a block on the patient's operative upper extremity.  The patient was placed supine on the operating room table and all of their bony prominences were padded.  A operating room huddle was performed and the patient received IV antibiotics.  The patient was placed into a beachchair position with all of their agata prominences padded and SCDs were applied to the lower extremities and used throughout the procedure.   The patient's right upper extremity was prepped and draped in the normal sterile fashion.  A pre-incision timeout was called.  A shoulder arthroscopy was then performed, using the standard posterior viewing portal, anterior, anterolateral, and lateral working portals.  Throughout the shoulder arthroscopy, no loose bodies were identified.   In the glenohumeral joint, the cartilage of the humeral head was relatively well maintained.  There was some mild grade 2 cartilage wear in the central aspect of the glenoid.  The patient was found to have labral tearing, with some anterior and posterior labral tearing, as well as a small SLAP tear.  The biceps tendon was found to be erythematous.  The biceps tendon was then tagged with a spinal needle and cut at its insertion on the labrum.  Scar tissue was seen throughout the  anterior capsule.  An extensive debridement was then performed with an arthroscopic shaver, debriding the subacromial bursa as well as the labrum tearing anteriorly and posteriorly.  The biceps anchor complex tearing was debrided down to normal healthy labrum.  The biceps tendon stump was then debrided down to normal healthy labrum.  The glenoid cartilage wear was debrided with the arthroscopic shaver.  Scarring in the anterior capsule was debrided with the arthroscopic shaver and the anterior interval was opened up with the shaver.      The subscapularis tendon was then examined.  The subscapularis was intact and did not require repair.    The supraspinatus tendon was then examined.  The supraspinatus was intact.    The arthroscope was then put in the subacromial bursa and a subacromial decompression was performed.  A prominent hook was found on the anterolateral aspect of the acromion as well as a significant hook on the undersurface of the os acromion, so a partial acromioplasty was performed using a hansa in the cutting block technique.  The partial acromioplasty was carefully performed in order to convert the patient to a type I acromion and remove any of the undersurface of the impinging os acromiale.  The biceps tendon was pulled in the subacromial space and freed up down to the bottom of the bicipital groove, where an 8 mm tunnel was drilled.  The biceps tendon was pushed into the tunnel and secured with interference screw, completing the arthroscopic biceps tenodesis.      The supraspinatus was examined from the bursal side and found to be intact.    After the shoulder arthroscopy was completed, including the biceps tenodesis, extensive debridement, and subacromial decompression with partial acromioplasty, a complete shoulder arthroscopy was then again performed and no further pathology was identified.  The shoulder was drained of fluid and the arthroscopic portals were closed with 3-0 nylon skin sutures.   Adaptic, dry sterile dressing, and a shoulder immobilizer were applied.  The patient was awoken and brought to the recovery room in good condition.  There were no complications.

## 2024-05-23 NOTE — ANESTHESIA PROCEDURE NOTES
Peripheral Block    Patient location during procedure: pre-op  Start time: 5/23/2024 1:00 PM  End time: 5/23/2024 1:20 PM  Reason for block: at surgeon's request and post-op pain management  Staffing  Performed: attending   Authorized by: Macario Kim MD    Performed by: Macario Kim MD  Preanesthetic Checklist  Completed: patient identified, IV checked, site marked, risks and benefits discussed, surgical consent, monitors and equipment checked, pre-op evaluation and timeout performed   Timeout performed at: 5/23/2024 12:20 PM  Peripheral Block  Patient position: laying flat  Prep: ChloraPrep and site prepped and draped  Patient monitoring: continuous pulse ox, heart rate and cardiac monitor  Block type: interscalene  Laterality: right  Injection technique: single-shot  Guidance: nerve stimulator and ultrasound guided  Local infiltration: lidocaine  Infiltration strength: 1 %  Dose: 3 mL  Needle  Needle gauge: 20 G  Needle localization: ultrasound guidance     image stored in chart  Assessment  Injection assessment: negative aspiration for heme, local visualized surrounding nerve on ultrasound, no paresthesia on injection, incremental injection and transient paresthesias  Paresthesia pain: none  Heart rate change: no  Slow fractionated injection: yes  Additional Notes  Interscalene Block  Time out verbal and written consent  Sterile tech, wide prep w chlorhex  US guided  Lido local   Lukasz 0.375% + epi 1:200K+ decadron 5 mg+ tetracaine 20mg  30 ml  Versed 4 mg divided  No Complications

## 2024-05-23 NOTE — DISCHARGE INSTRUCTIONS
Wound Care  Your dressing should remain intact and dry until your post op visit in office. No showering until seen in clinic.--sitting in bathtub to sponge bathe is ok. Place a protective cover (large garbage bag) over your shoulder (with sling on) while sponge bathing. Do NOT immerse your operative shoulder in bath or pool water.     Biceps Tenodesis  You must keep your sling on while bathing to prevent active motion of your elbow.  While sponge bathing keep your operative arm at your side, you are not to raise or reach with your arm during the first 6 weeks following surgery. Lifting or raising your arm under your own strength could cause damage to your surgically repaired shoulder.     Surgical Dressing     If your dressing becomes soiled or damp, you may remove the dressing and replace the bandage. Please do not remove steri-strips (small pieces of tape) covering your incisions, if present. Please be certain to wash hands thoroughly prior to changing dressing, do not place any ointments over incisions.     Sling/Immobilizer     Anesthesia effects can last up until 48 hours after surgery.  Please be aware that you may experience numbness in your arm for up to 48 hours after surgery.  During this time it is extremely important that you keep your sling in place at all times because you will not have control of your arm due to the anesthesia effects.  Your sling with supporting abduction pillow should be worn at all times.  Maintain your elbow position against the pillow and even with your side or in front of this position to minimize stress on the repair.      Activity     When sleeping or resting, inclined positions (i.e. reclining chair) using a pillow under the forearm for support may provide better comfort  Do not engage in activities which increase pain/swelling over the first 7-10 days following surgery  Avoid long periods of sitting (without arm supported) or long distance traveling for 2 weeks  May return  to sedentary work ONLY or school 3-4 days after surgery, if pain is tolerable     Continuous icing will help to decrease swelling and provide pain relief.  You may use ice packs every 2 hours for 20 minutes daily until your first post-operative visit.  It is very important to always have protection between the ice pad and your skin.  Never place the ice pad directly on your skin; this could lead to an injury to your skin.      Driving     Please do not drive until you are evaluated in the office after surgery.  You are considered an impaired  following surgery, and if you choose to drive, your insurance may not cover any damages that may occur.     Post-operative Medication               1. Aspirin 325mg 1 tablet twice a day for 4 weeks following surgery.  Aspirin helps to reduce the risk of blood clots following surgery.      2. Colace 1 tablet twice a day.  Colace is used to prevent constipation while taking pain medication.    3.  Mobic 15 mg take 1 tablet daily for 5 days, after 5 days take 1 tablet daily only as needed for pain.     4.Tylenol 1gm every 8 hours for 5 days, after 5 days take 1gm every 8 hours only as needed for pain.     5.Gabapentin 300mg daily at bedtime for 5 days, after 5 days you should no longer need this medication.     6. Oxy IR 5mg every 6 hours ONLY TO BE TAKEN FOR SEVERE PAIN.  Do NOT take Oxy IR within 3 hours of taking Gabapentin. Do NOT take Gabapentin within 3 hours of taking Oxy IR. If you are having severe pain and taking Oxy IR at night skip the nightly Gabapentin and only resume when you are not taking Oxy IR at night     Signs and Symptoms of Complications     Although complications are rare, the following are a list of potential symptoms you should be alert for.    Infection - Increased pain not relieved with medication, fever (temperature of 101.5 degrees Fahrenheit or higher), chills, redness, swelling or drainage (yellow/brown/green) from incision.  Blood Clot - If  you experience shortness of breath, chest pain, pain in your chest with deep breaths or difficulty breathing, please report to emergency room immediately.   Persistent Pain - Severe sharp pain not relieved by pain medication.  Persistent and increasing swelling and numbness of the arm.        If a follow-up visit after surgery was not scheduled, please call Dr. Sanders's office at [phone] during office business hours (Monday to Friday, 8:30am to 3:30pm) to arrange a follow-up appointment for 2 weeks after your surgery.    If you have any questions, please call Dr. Sanders's office at [phone] during office business hours (Monday to Friday, 8:30am to 3:30pm).  Please do not call Dr. Sanders's office after 3:30pm or on weekends or holidays.  If you have an urgent issue after 3:30pm or on weekends or holidays, please go immediately to a local emergency room to be evaluated.

## 2024-05-23 NOTE — ANESTHESIA PREPROCEDURE EVALUATION
Patient: Adrianne Rodríguez    Procedure Information       Date/Time: 24 1300    Procedure: SHOULDER SCOPE, BICEPS TENODESIS, EXTENSIVE DEBRIDEMENT, SUBACROMIAL DECOMPRESSION (Right: Shoulder) - right shoulder scope, biceps tenodesis, extensive debridement, subacromial decompression    [facility]    Surgeons: MCKENZIE Sanders MD     There were no vitals filed for this visit.    Past Surgical History:   Procedure Laterality Date   • OTHER SURGICAL HISTORY  2020     section   • OTHER SURGICAL HISTORY  2021    Colposcopy   • OTHER SURGICAL HISTORY  2021    Breast surgery   • OTHER SURGICAL HISTORY  2021    Cervical loop electrosurgical excision   • OTHER SURGICAL HISTORY  2022    Tubal ligation     Past Medical History:   Diagnosis Date   • 14 weeks gestation of pregnancy ([facility]) 2021    14 weeks gestation of pregnancy   • 16 weeks gestation of pregnancy ([facility]) 2021    16 weeks gestation of pregnancy   • 20 weeks gestation of pregnancy ([facility]) 2021    20 weeks gestation of pregnancy   • 24 weeks gestation of pregnancy ([facility]) 2021    24 weeks gestation of pregnancy   • 28 weeks gestation of pregnancy ([facility]) 2021    28 weeks gestation of pregnancy   • 33 weeks gestation of pregnancy ([facility]) 10/13/2021    33 weeks gestation of pregnancy   • 35 weeks gestation of pregnancy ([facility]) 10/28/2021    35 weeks gestation of pregnancy   • Abnormal findings on diagnostic imaging of other specified body structures 2020    Abnormal CAT scan   • Abnormal level of blood mineral 2020    Low iron stores   • Acute bronchospasm 2019    Acute bronchospasm   • Biceps tendinitis of right shoulder    • Body mass index (BMI)40.0-44.9, adult 02/10/2021    Body mass index (BMI) of 40.0 to 44.9 in adult   • Encounter for immunization 10/13/2021    Need for Tdap vaccination   • Encounter for pregnancy test, result  positive ([facility]) 2021    Positive urine pregnancy test   • Encounter for routine postpartum follow-up ([facility]) 12/15/2021    Encounter for postpartum visit   • Encounter for supervision of other normal pregnancy, third trimester ([facility]) 10/28/2021    Encounter for supervision of normal pregnancy in multigravida in third trimester   • Impingement syndrome of right shoulder    • Inappropriate diet and eating habits 2020    Inappropriate diet and eating habits   • Labral tear of shoulder, right, initial encounter    • Major depressive disorder, recurrent, unspecified (CMS-HCC) 01/15/2019    Major depressive disorder, recurrent episode with anxious distress   • Mastodynia 2020    Pain of left breast   • Nonpurulent mastitis associated with the puerperium ([facility]) 2020    Mastitis, postpartum   • Obesity, unspecified 2019    Obesity (BMI 30-39.9)   • Other allergy status, other than to drugs and biological substances 2020    History of environmental allergies   • Other conditions influencing health status 2021    History of pregnancy   • Other malaise 2020    Malaise and fatigue   • Other specified abnormal findings of blood chemistry 2020    Abnormal TSH   • Pain in right knee     Bilateral knee pain   • Pain in right knee 2021    Knee pain, right   • Pain in unspecified hip 2019    Hip pain   • Papillomavirus as the cause of diseases classified elsewhere     HPV in female   • Personal history of other complications of pregnancy, childbirth and the puerperium 2021    History of threatened    • Personal history of other diseases of the female genital tract     History of abnormal cervical Papanicolaou smear   • Personal history of other drug therapy 10/13/2021    History of influenza vaccination   • Personal history of other specified conditions 2020    History of syncope   • Personal history of other specified conditions  02/10/2021    History of flank pain   • Personal history of other specified conditions 04/21/2022    History of vertigo   • Personal history of other specified conditions 07/30/2019    History of palpitations   • RLS (restless legs syndrome)    • Scotoma involving central area, unspecified eye 04/28/2020    Visual field scotoma   • Secondary amenorrhea 04/21/2021    Amenorrhea, secondary   • Unspecified condition associated with female genital organs and menstrual cycle 02/17/2022    Vaginal discomfort   • Unspecified visual disturbance 04/28/2020    Vision disturbance   • Ventricular premature depolarization 07/30/2019    Frequent PVCs   • Vertigo    • Vomiting of pregnancy, unspecified ([facility]) 04/21/2021    Pregnancy, excessive vomiting     No current facility-administered medications for this encounter.  Prior to Admission medications    Medication Sig Start Date End Date Taking? Authorizing Provider   acetaminophen (Tylenol) 500 mg tablet Take 2 tablets (1,000 mg) by mouth every 8 hours if needed for mild pain (1 - 3) (Take 2 tablets every 8 hours for 5 days, then after 5 days take 2 tablets as needed for pain). 5/23/24   MCKENZIE Sanders MD   aspirin 325 mg EC tablet Take 1 tablet (325 mg) by mouth 2 times a day. 5/23/24 6/22/24  MCKENZIE Sanders MD   calcium carbonate-vitamin D3 500 mg-5 mcg (200 unit) tablet Take 1 tablet by mouth once daily.    Historical Provider, MD   docusate sodium (Colace) 100 mg capsule Take 1 capsule (100 mg) by mouth 2 times a day as needed for constipation for up to 14 days. 5/23/24 6/6/24  MCKENZIE Sanders MD   ferrous sulfate 300 mg (60 mg iron)/5 mL syrup Take 5 mL (60 mg of iron) by mouth once daily.    Historical Provider, MD   fluticasone (Flonase) 50 mcg/actuation nasal spray Administer 2 sprays into each nostril once daily.    Historical Provider, MD   gabapentin (Neurontin) 300 mg capsule Take 1 capsule (300 mg) by mouth once daily at bedtime for 5 days. 5/23/24 5/28/24  MCKENZIE Sanders MD    loratadine (Claritin) 10 mg tablet Take 1 tablet (10 mg) by mouth once daily.    Historical Provider, MD   meloxicam (Mobic) 15 mg tablet Take 1 tablet (15 mg) by mouth once daily as needed for moderate pain (4 - 6) (pain). 5/23/24 6/22/24  MCKENZIE Sanders MD   omeprazole OTC (PriLOSEC OTC) 20 mg EC tablet Take 1 tablet (20 mg) by mouth once daily in the morning. Take before meals. Do not crush, chew, or split.    Historical Provider, MD   oxyCODONE (Roxicodone) 5 mg immediate release tablet Take 1 tablet (5 mg) by mouth every 6 hours if needed for severe pain (7 - 10) (Do not take within 3 hours of GABAPENTIN). 5/23/24   MCKENZIE Sanders MD   acetaminophen (Tylenol) 500 mg tablet Take 2 tablets (1,000 mg) by mouth every 8 hours if needed for mild pain (1 - 3) (Take 2 tablets every 8 hours for 5 days, then after 5 days take 2 tablets as needed for pain). 5/23/24 5/23/24  MCKENZIE Sanders MD   aspirin 325 mg EC tablet Take 1 tablet (325 mg) by mouth 2 times a day. 5/23/24 5/23/24  MCKENZIE Sanders MD   docusate sodium (Colace) 100 mg capsule Take 1 capsule (100 mg) by mouth 2 times a day as needed for constipation for up to 14 days. 5/23/24 5/23/24  MCKENZIE Sanders MD   gabapentin (Neurontin) 300 mg capsule Take 1 capsule (300 mg) by mouth once daily at bedtime for 5 days. 5/23/24 5/23/24  MCKENZIE Sanders MD   meloxicam (Mobic) 15 mg tablet Take 1 tablet (15 mg) by mouth once daily as needed for moderate pain (4 - 6) (pain). 5/23/24 5/23/24  MCKENZIE Sanders MD   naproxen (Naprosyn) 250 mg tablet Take 1 tablet (250 mg) by mouth 2 times daily (morning and late afternoon).  5/17/24  Historical Provider, MD   oxyCODONE (Roxicodone) 5 mg immediate release tablet Take 1 tablet (5 mg) by mouth every 6 hours if needed for severe pain (7 - 10) (Do not take within 3 hours of GABAPENTIN). 5/23/24 5/23/24  MCKENZIE Sanders MD     No Known Allergies  Social History     Tobacco Use   • Smoking status: Never   • Smokeless tobacco: Never   Substance Use Topics   •  "Alcohol use: Yes     Comment: occasional         Chemistry    Lab Results   Component Value Date/Time     05/17/2024 1307    K 4.2 05/17/2024 1307     05/17/2024 1307    CO2 26 05/17/2024 1307    BUN 14 05/17/2024 1307    CREATININE 0.60 05/17/2024 1307    Lab Results   Component Value Date/Time    CALCIUM 9.5 05/17/2024 1307    ALKPHOS 56 04/11/2024 1011    AST 21 04/11/2024 1011    ALT 25 04/11/2024 1011    BILITOT 0.6 04/11/2024 1011          Lab Results   Component Value Date/Time    WBC 6.5 05/17/2024 1307    HGB 12.7 05/17/2024 1307    HCT 38.1 05/17/2024 1307     05/17/2024 1307     No results found for: \"PROTIME\", \"PTT\", \"INR\"  No results found for this or any previous visit (from the past 4464 hour(s)).  No results found for this or any previous visit from the past 1095 days.        Relevant Problems   Cardiac   (+) Mixed hyperlipidemia   (+) Rib pain      Neuro   (+) Anxiety and depression   (+) Mixed anxiety depressive disorder   (+) Situational anxiety      GI   (+) Gastroesophageal reflux disease   (+) Irritable bowel syndrome      /Renal   (+) Urinary tract infection      Endocrine   (+) Class 2 obesity due to excess calories without serious comorbidity with body mass index (BMI) of 39.0 to 39.9 in adult   (+) Class 2 obesity without serious comorbidity with body mass index (BMI) of 38.0 to 38.9 in adult   (+) Obesity due to energy imbalance   (+) Severe obesity (Multi)      Hematology   (+) Anemia      HEENT   (+) Hearing loss      ID   (+) COVID-19 virus infection   (+) HPV in female   (+) Paronychia of toe of left foot   (+) Urinary tract infection      GYN   (+) Abnormal uterine bleeding   (+) DUB (dysfunctional uterine bleeding)       Clinical information reviewed:                   NPO Detail:  No data recorded     Physical Exam    Airway  Mallampati: II  TM distance: >3 FB  Neck ROM: full     Cardiovascular - normal exam     Dental    Pulmonary - normal exam     Abdominal - " normal exam         Anesthesia Plan    History of general anesthesia?: yes  History of complications of general anesthesia?: no    ASA 2     general and regional     The patient is not a current smoker.    Anesthetic plan and risks discussed with patient.  Use of blood products discussed with patient who.    Plan discussed with CRNA and attending.

## 2024-05-23 NOTE — ANESTHESIA PROCEDURE NOTES
Airway  Date/Time: 5/23/2024 2:31 PM  Urgency: elective    Airway not difficult    Staffing  Performed: CRNA   Authorized by: Macario Kim MD    Performed by: THEA Lafleur-SAMMIE  Patient location during procedure: OR    Indications and Patient Condition  Indications for airway management: anesthesia  Spontaneous Ventilation: absent  Sedation level: deep  Preoxygenated: yes  Patient position: sniffing  Mask difficulty assessment: 0 - not attempted    Final Airway Details  Final airway type: endotracheal airway      Successful airway: ETT     Successful intubation technique: video laryngoscopy  Blade: Bean  Blade size: #3  ETT size (mm): 7.0  Cormack-Lehane Classification: grade IIa - partial view of glottis  Placement verified by: chest auscultation and capnometry   Measured from: lips  ETT to lips (cm): 24  Number of attempts at approach: 1

## 2024-06-07 ENCOUNTER — OFFICE VISIT (OUTPATIENT)
Dept: ORTHOPEDIC SURGERY | Facility: CLINIC | Age: 34
End: 2024-06-07
Payer: COMMERCIAL

## 2024-06-07 VITALS — WEIGHT: 229 LBS | BODY MASS INDEX: 38.15 KG/M2 | HEIGHT: 65 IN

## 2024-06-07 DIAGNOSIS — M75.21 BICEPS TENDINITIS OF RIGHT SHOULDER: ICD-10-CM

## 2024-06-07 DIAGNOSIS — S43.431A LABRAL TEAR OF SHOULDER, RIGHT, INITIAL ENCOUNTER: ICD-10-CM

## 2024-06-07 PROCEDURE — 1036F TOBACCO NON-USER: CPT | Performed by: ORTHOPAEDIC SURGERY

## 2024-06-07 PROCEDURE — 99024 POSTOP FOLLOW-UP VISIT: CPT | Performed by: ORTHOPAEDIC SURGERY

## 2024-06-07 PROCEDURE — 3008F BODY MASS INDEX DOCD: CPT | Performed by: ORTHOPAEDIC SURGERY

## 2024-06-07 RX ORDER — OXYCODONE HYDROCHLORIDE 5 MG/1
5 TABLET ORAL EVERY 6 HOURS PRN
Qty: 28 TABLET | Refills: 0 | Status: SHIPPED | OUTPATIENT
Start: 2024-06-07

## 2024-06-07 ASSESSMENT — PAIN - FUNCTIONAL ASSESSMENT: PAIN_FUNCTIONAL_ASSESSMENT: NO/DENIES PAIN

## 2024-06-07 NOTE — PROGRESS NOTES
2 weeks status post right shoulder scope arthroscopic biceps tenodesis.  Patient is doing well.      The patient has no complaints today and states they are doing well.  Their pain is well controlled on oral pain medications.  They have not had any chest pain, shortness of breath, fevers, chills, or calf tenderness.  The pain from their surgery is gradually improving and they have not had any drainage from the wounds, redness, or any other constitutional symptoms (fevers, chills, feeling 'under the weather', etc.).      Right shoulder incisions are clean dry intact healing well and she is neurovascular tact right upper extremity    Overall, the patient is doing very well after their surgery and is pleased with their progress. Today we removed the stitches and applied Steri-Strips. It is okay for the patient to shower but avoid soaking the wounds (no pools or bathtubs) for 3-4 more weeks until the wounds are completely healed.  The patient can slowly increase their activity levels, but must do so slowly to avoid aggravating the joint.  We gave the patient a prescription for physical therapy to work on range of motion and strengthening of the extremity.  We will plan on seeing the patient back in 8 more weeks. I told the patient to call with any questions or problems.

## 2024-06-07 NOTE — PROGRESS NOTES
Patient asked for another refill of her oxycodone 5 mg. I sent it to her pharmacy. This is her last and final refill.   DEISY ARCINIEGA MA

## 2024-06-10 ENCOUNTER — TELEPHONE (OUTPATIENT)
Dept: ORTHOPEDIC SURGERY | Facility: CLINIC | Age: 34
End: 2024-06-10
Payer: COMMERCIAL

## 2024-06-10 NOTE — TELEPHONE ENCOUNTER
5/23/24 rt shoulder scope, bicep tenodesis  Patient called and stated she needs a letter typed up for her employer stating she is currently taking and being prescribed a pain medication by Dr. Sanders. Ok to type up?

## 2024-06-10 NOTE — LETTER
Carolyn 10, 2024     Adrianne Rodríguez    Patient: Adrianne Rodríguez   YOB: 1990   Date of Visit: 6/10/2024       To whom may concern, Adrianne has been prescribed pain medication from our office and will be currently taking the medication for 1-2 weeks        Sincerely,     MCKENZIE Sanders MD      CC: No Recipients  ______________________________________________________________________________________

## 2024-06-12 ENCOUNTER — EVALUATION (OUTPATIENT)
Dept: PHYSICAL THERAPY | Facility: CLINIC | Age: 34
End: 2024-06-12
Payer: COMMERCIAL

## 2024-06-12 DIAGNOSIS — M75.21 BICEPS TENDINITIS OF RIGHT SHOULDER: ICD-10-CM

## 2024-06-12 DIAGNOSIS — Z98.890 STATUS POST SUBACROMIAL DECOMPRESSION: Primary | ICD-10-CM

## 2024-06-12 PROCEDURE — 97161 PT EVAL LOW COMPLEX 20 MIN: CPT | Mod: GP

## 2024-06-12 PROCEDURE — 97110 THERAPEUTIC EXERCISES: CPT | Mod: GP

## 2024-06-12 ASSESSMENT — PATIENT HEALTH QUESTIONNAIRE - PHQ9
2. FEELING DOWN, DEPRESSED OR HOPELESS: NOT AT ALL
SUM OF ALL RESPONSES TO PHQ9 QUESTIONS 1 AND 2: 0
1. LITTLE INTEREST OR PLEASURE IN DOING THINGS: NOT AT ALL

## 2024-06-12 NOTE — PROGRESS NOTES
Physical Therapy Evaluation    Patient Name Adrianne Rodríguez  MRN: [id number]  Today's Date: 06/12/24  Time Calculation  Start Time: 0855  Stop Time: 0935  Time Calculation (min): 40 min      Assessment:  Signs and symptoms consistent with diagnosis.  The pt presents with post operative pain and limited ROM, strength and flexibility.  She is limited in functional use of the R UE for ADLs, work, etc. The pt is an excellent candidate for skilled therapy to address the above listed limitations and to improve tolerance for functional activities.    Impression:  Skilled PT services will aid in advancing functional status and attaining therapy goals.    Problem List:  -activity limitations  -Functional limitations  -Pain R shoulder  -decreased  ROM  -decreased strength   -decreased flexibility  -posture awareness/ body mechanics  -decreased knowledge of HEP    Goals:  STG: Pt (I) with initial HEP; By week 3; Goal     LTG Goals: 6/12/24  By week: 10-12 weeks    Pain: Decrease R shoulder pain to 1/10 or < with functional activities ; Goal     Posture: pt to demonstrate postural and body mechanics awareness ; Goal     ROM: Increase  R shoulder AROM to WNL without limitation from pain. ;  Goal     Strength: Increase scapular and UE strength to 4+/5 or > grossly for improved tolerance for functional activities. ; Goal     Flexibility: Improve flexibility of c-spine/UE to WFL  ; Goal     Palpation: Decrease pain with palpation by 50% or >; Goal    HEP: Pt to be (I) with HEP  ; Goal     Outcome Measurement: Quick DASH = 17/11 or < ; Goal     Rehab Potential: excellent    Plan:  Evaluation, Re-evaluation, Therapeutic Exercise, Therapeutic Activity; Neuromuscular reeducation; Postural Education; Body Mechanics; Home Exercise Program; Manual Techniques; Cold Pack; E-Stim; Ultrasound;     2 x week  until goals met or maximum rehab potential met  Pt is currently scheduled for n/a weeks    Plan of care was designed with input and  agreement by the patient        Therapy Diagnoses:   Problem List Items Addressed This Visit             ICD-10-CM    Biceps tendinitis of right shoulder M75.21    Relevant Orders    Follow Up In Physical Therapy    Status post subacromial decompression - Primary Z98.890       General:  Reason for visit: s/p R shoulder subacromial decompression and  biceps tenodesis, extensive debridement  Referred by: Dr MCKENZIE Charles MD appt:  7/26/24  Preferred Name:  Victoria  Script:  eval and treat, pt brought in the doctor's protocol  Onset Date:  5/23/24    Insurance:  - [employer], 100%  -authorization required: yes  -number of visits authorized: unknown  -Authorization dates: unknown      Subjective:    Current Episode of Functional Impairment and/or Pain : R sx 5/23/24  s/p R shoulder subacromial decompression and  biceps tenodesis, extensive debridement. R handed. Insidious onset prior to sx.  Sling off as tolerated.  Has been able to sleep without it using a pillow. Pain has been manageable other than with HEP there is an increase.  The doctor instructed her to do wall climbs.         Pain       Pain assessment 0-10  Pain score: 5, with exercises 6-7  Pain location: R shoulder    Exacerbating Factors: HEP, arm movement, sleep  Relieving Factors: icing, tylenol, meloxicam, oxycodone    Functional Limitations:  Functional Limitations: Reaching, Lifting, Dressing, Bathing, Sleeping, Working, and Sport    Home Living Situation: lives with family    Prior Level of Function: (I) with ADLs    Patient Goal For Therapy: To decrease pain and resume normal activities, including working out and caring for young children without limitation from shoulder pain.     Occupation: Teacher, off for summer    Hobbies: Working out at the [employer], has a 2 and 4 year old child.    Precautions:     Current Medical management:     PMHx: n/a     Medications for pain: tylenol, meloxicam, oxycodone     Diagnostic Tests: MRI  Fall risk:  "none    Objective:    Pain:            R Shoulder 6-7/10     Posture:           Min forward head, rounded shoulders decreased postural awareness    ROM:           AROM           shoulder flexion R n/a , L n/a degrees           abd R n/a , L n/a           extension R n/a , L n/a          IR R n/a , L n/a          ER R n/a , L n/a          PROM           shoulder flexion R  98 degrees          abd 35          ER 20    Strength:           Shoulder flexion R/L n/a                          abd R/L n/a                          add R/L n/a                          extension R/L n/a                          IR R/L n/a                          ER R/L n/a                          biceps R/L n/a                          triceps R/L n/a    Flexibility:           Upper trap R/L min / WNL restriction          Pec R/L mon / WNL          Levator Scapula R/L n/a / n/a       Ortho:  Outcome Measures:  Other Measures  Disability of Arm Shoulder Hand (DASH): 50/11     Treatment:    Evaluation:  20 minutes    **= HEP  NV= Next visit  np= not performed  nb= non-billable  G= group HEP= discharged to HEP    Therapeutic Exercise: 10  Pendulums CW/CCW: x 20 each **  Scapular retraction: 3\" x 10 **  AROM elbow/forearm: x 5 each **  Cane shoulder ER with abduction at 0 degrees: 3\" x 10 **    NMR:         Education: Pt educated on dx, POC, anatomy, posture, ther ex technique and HEP  Preferred learning:  pictures, demonstration.  Demonstrated good understanding.      Access Code: [email or web address]  URL: [email or web address]  Date: 06/12/2024  Prepared by: Soledad Hagen    Exercises  - Circular Shoulder Pendulum with Table Support  - 2-3 x daily - 7 x weekly - 1-2 sets - 20 reps  - Standing Scapular Retraction  - 3-5 x daily - 7 x weekly - 10 reps - 5 hold  - Seated Shoulder External Rotation AAROM with Dowel  - 2-3 x daily - 7 x weekly - 1-2 sets - 10 reps - 3-5 hold  - Seated Elbow Flexion and Extension AROM  - 3-5 x daily - 7 " x weekly - 2-3 sets - 10 reps  - Seated Forearm Pronation and Supination AROM  - 3-5 x daily - 7 x weekly - 1-2 sets - 10 reps

## 2024-06-14 ENCOUNTER — TREATMENT (OUTPATIENT)
Dept: PHYSICAL THERAPY | Facility: CLINIC | Age: 34
End: 2024-06-14
Payer: COMMERCIAL

## 2024-06-14 DIAGNOSIS — M75.21 BICEPS TENDINITIS OF RIGHT SHOULDER: ICD-10-CM

## 2024-06-14 PROCEDURE — 97110 THERAPEUTIC EXERCISES: CPT | Mod: GP | Performed by: PHYSICAL THERAPIST

## 2024-06-14 PROCEDURE — 97014 ELECTRIC STIMULATION THERAPY: CPT | Mod: GP | Performed by: PHYSICAL THERAPIST

## 2024-06-14 PROCEDURE — 97140 MANUAL THERAPY 1/> REGIONS: CPT | Mod: GP | Performed by: PHYSICAL THERAPIST

## 2024-06-14 NOTE — PROGRESS NOTES
Physical Therapy  Physical Therapy Progress Note    Patient Name Adrianne Rodríguez   MRN: [id number]  Today's Date: 6/14/2024  Time Calculation  Start Time: 1045  Stop Time: 1145  Time Calculation (min): 60 min    Insurance:    Visit #:   2   - [employer], 100%  -authorization required: yes  -number of visits authorized: 24  -Authorization dates: 6-24-24/9-6-24    Therapy Diagnoses:   Problem List Items Addressed This Visit             ICD-10-CM    Biceps tendinitis of right shoulder M75.21       General:  Reason for visit: s/p R shoulder subacromial decompression and  biceps tenodesis, extensive debridement  Referred by: Dr MCKENZIE Charles MD appt:  7/26/24  Preferred Name:  Victoria  Script:  eval and treat, pt brought in the doctor's protocol  Onset Date:  5/23/24    Assessment:  Patient tolerated treatment well, did well with progression this date.    Patient needs continued work on/skilled PT for: ROM and scapular stabilization to address remaining functional, objective and subjective deficits to allow them to return to prior /optimal level of function with ADLs.  Patient is progressing with goals: HEP compliance  Skilled care:  exercise and HEP progression, manual and modalities.     Plan:    Continue to progress per poc:   NV add Progress with scapular clocks and continue to progress with shoulder ROM and monitor response to IFC/CP    Subjective:   Patient reports:  that she is sleeping propped up and she is coming out of the sling some at home.  Patient did not take pain meds prior to PT due to them making her groggy and needing to drive to PT.      Have you fallen since last visit:  no    Precautions:  no fall risk  Current Medical management:     PMHx: n/a     Medications for pain: tylenol, meloxicam, oxycodone     Diagnostic Tests: MRI    Pain:  0/10 coming into PT in sling, up to 10/10 with PROM  Location/Type of pain:  Generalized R shoulder pain.     HEP compliance/understanding:  yes    Objective:  "  Objective Measurements:    Function:   sleeping propped up in chair  ROM:  PROM R shoulder Supine   Flex:  0/163   Elevation:  0/160    ER:  0/31      Treatment:   **= HEP  NV= Next visit  np= not performed  nb= non-billable  G= group HEP= discharged to HEP  Therapeutic Exercise:     21 minutes  Nu-step:  soft tissue warm up and subjective taken:  Lev 1 x 8' ( R shoulder PROM only)  Pendulums all planes: x 20 ea.   Pulleys flex/elev:  x 10 ea.   R upper trap stretch:  3/30\" **    Manual Therapy:     15 minutes  PROM R shoulder all planes:  ER limited to 40 only per protocol    Neuromuscular Re-education:    2 minutes  Scapular pinches:  x 10/5\"    Modalities:       Electrical Stimulation        15 minutes  IFC/CP to R shoulder seated x 15'//40%/Intensity:  sensory stim, patient given controller/seated with pillow under axilla    Education:  exercise and HEP progression and importance of early ROM return.  HEP Progression:    Access Code: [email or web address]  URL: [email or web address]  Date: 06/14/2024  Prepared by: Guillermina Davis    Exercises  - Seated Upper Trapezius Stretch  - 1 x daily - 7 x weekly - 1 sets - 3 reps - 30 second hold  "

## 2024-06-25 ENCOUNTER — TREATMENT (OUTPATIENT)
Dept: PHYSICAL THERAPY | Facility: CLINIC | Age: 34
End: 2024-06-25
Payer: COMMERCIAL

## 2024-06-25 DIAGNOSIS — M75.21 BICEPS TENDINITIS OF RIGHT SHOULDER: ICD-10-CM

## 2024-06-25 PROCEDURE — 97014 ELECTRIC STIMULATION THERAPY: CPT | Mod: GP | Performed by: PHYSICAL THERAPIST

## 2024-06-25 PROCEDURE — 97110 THERAPEUTIC EXERCISES: CPT | Mod: GP | Performed by: PHYSICAL THERAPIST

## 2024-06-25 PROCEDURE — 97140 MANUAL THERAPY 1/> REGIONS: CPT | Mod: GP | Performed by: PHYSICAL THERAPIST

## 2024-06-25 NOTE — PROGRESS NOTES
Physical Therapy  Physical Therapy Progress Note    Patient Name Adrianne Rodríguez   MRN: [id number]  Today's Date: 6/25/2024  Time Calculation  Start Time: 0400  Stop Time: 0500  Time Calculation (min): 60 min    Insurance:    Visit #:   3   - [employer], 100%  -authorization required: yes  -number of visits authorized: 24  -Authorization dates: 6-24-24/9-6-24    Therapy Diagnoses:   Problem List Items Addressed This Visit             ICD-10-CM    Biceps tendinitis of right shoulder M75.21     General:  Reason for visit: s/p R shoulder subacromial decompression and  biceps tenodesis, extensive debridement  Referred by: Dr MCKENZIE Charles MD appt:  7/26/24  Preferred Name:  Victoria  Script:  eval and treat, pt brought in the doctor's protocol  Onset Date:  5/23/24  Email/text:  [id number]    Assessment:  Patient tolerated treatment well, did well with progression this date.    Patient needs continued work on/skilled PT for: ROM and strength as well as scapular stability to address remaining functional, objective and subjective deficits to allow them to return to prior /optimal level of function with ADLs.  Patient is progressing with goals: improving ROM and postural awareness.    Skilled care:  exercise and HEP progression, manual and education.     Plan:    Continue to progress per poc:   NV continue with current activities, progress PROM, until 7-4-24 when able to progress to 6 week post-op activities.      Subjective:   Patient reports:  that she did not have too much pain after last appointment.  Patient comes to PT today without sling on and she is able to dress herself with some compensation.   Good response to IFC last visit.      Have you fallen since last visit:  no    Precautions:   no fall risk  Current Medical management:     PMHx: n/a     Medications for pain: tylenol, meloxicam, oxycodone     Diagnostic Tests: MRI    Pain:  5/10  Location/Type of pain:  superior R shoulder aching.      HEP  "compliance/understanding:  yes    Objective:   Objective Measurements:    Function:   sleeping in recliner  Posture: increased postural awareness  ROM:   PROM R shoulder Supine  Flex:  0/165   Elevation:  0/162    ER:  0/46    Treatment:   **= HEP  NV= Next visit  np= not performed  nb= non-billable  G= group HEP= discharged to HEP  Therapeutic Exercise:     30 minutes  Nu-step:  soft tissue warm up and subjective taken:  Lev 1 x 10' ( R shoulder PROM only)  Pendulums all planes: x 20 ea.   Pulleys flex/elev:  x 30 ea.   Cane ER: elbow at side and bent to 90:  15/5\"  Cane AAROM elevation standing:   2 x 10**  Cane AAROM flexion in supine:    2 x 10 **  R shoulder IR/ER isometrics:  10/5\"**    Manual Therapy:     10 minutes  Grade II/III joint mobs R shoulder caudal and distraction glides and PROM all planes supine with bolster.     Neuromuscular Re-education:    5 minutes  Scapular pinches:  10/5\"**  Retro shoulder rolls: x 20    Modalities:       Electrical Stimulation        15 minutes  IFC/CP to R shoulder seated x 15'//40%/Intensity: sensory stim, patient given controller/seated with pillow under axilla     Education:  exercise and HEP progression  HEP Progression:    Access Code: [email or web address]  URL: [email or web address]  Date: 06/25/2024  Prepared by: Guillermina Davis    Exercises  - Seated Upper Trapezius Stretch  - 1 x daily - 7 x weekly - 1 sets - 3 reps - 30 second hold  - Seated Scapular Retraction  - 3-5 x daily - 7 x weekly - 1-2 sets - 10 reps - 5 second hold  - Standing Shoulder Scaption AAROM with Dowel (Mirrored)  - 1 x daily - 7 x weekly - 3 sets - 10 reps  - Standing Isometric Shoulder Internal Rotation with Towel Roll at Doorway  - 1 x daily - 7 x weekly - 2-3 sets - 10 reps - 5-10 seconds hold  - Standing Isometric Shoulder External Rotation with Doorway and Towel Roll  - 1 x daily - 7 x weekly - 2-3 sets - 10 reps - 5-10 seconds hold  "

## 2024-07-01 ENCOUNTER — TREATMENT (OUTPATIENT)
Dept: PHYSICAL THERAPY | Facility: CLINIC | Age: 34
End: 2024-07-01
Payer: COMMERCIAL

## 2024-07-01 DIAGNOSIS — M75.21 BICEPS TENDINITIS OF RIGHT SHOULDER: ICD-10-CM

## 2024-07-01 PROCEDURE — 97010 HOT OR COLD PACKS THERAPY: CPT | Mod: GP

## 2024-07-01 PROCEDURE — 97014 ELECTRIC STIMULATION THERAPY: CPT | Mod: GP

## 2024-07-01 PROCEDURE — 97110 THERAPEUTIC EXERCISES: CPT | Mod: GP

## 2024-07-01 PROCEDURE — 97140 MANUAL THERAPY 1/> REGIONS: CPT | Mod: GP

## 2024-07-01 NOTE — PROGRESS NOTES
Physical Therapy  Physical Therapy Progress Note    Patient Name Adrianne Rodríguez   MRN: [id number]  Today's Date: 7/1/2024  Time Calculation  Start Time: 0850  Stop Time: 0955  Time Calculation (min): 65 min    Insurance:    Visit #:   4   [facility]  -authorization required: yes  -number of visits authorized: 24  -Authorization dates: 6-24-24/9-6-24    Therapy Diagnoses:   Problem List Items Addressed This Visit             ICD-10-CM    Biceps tendinitis of right shoulder M75.21     General:  Reason for visit: s/p R shoulder subacromial decompression and  biceps tenodesis, extensive debridement  Referred by: Dr MCKENZIE Charles MD appt:  7/26/24  Preferred Name:  Victoria  Script:  eval and treat, pt brought in the doctor's protocol  Onset Date:  5/23/24  Email/text:  [id number]    Assessment:  Patient tolerated treatment well, she had some pain in the AC joint region with PROM, especially into ER.  She was advised to continue icing this area and to continue with the AAROM cane shoulder ER a few times a day as tolerated.   Patient needs continued work on/skilled PT for: ROM and strength as well as scapular stability to address remaining functional, objective and subjective deficits to allow them to return to prior /optimal level of function with ADLs.  Patient is progressing with goals: improving ROM and postural awareness.    Skilled care:  exercise and HEP progression, manual and education, IFC with CP    Plan:    Continue to progress per poc:   NV  able to progress to 6 week post-op activities.      Subjective:   Patient reports:  her pain level goes up to around 4-5/10.  Shoulder ER and the pulleys cause the most pain. She was sore during the PROM last visit but after the session she felt improved and had more mobility.     Have you fallen since last visit:  no    Precautions:   no fall risk  Current Medical management:     PMHx: n/a     Medications for pain: tylenol, meloxicam, oxycodone     Diagnostic  "Tests: MRI    Pain:  4-5/10  Location/Type of pain:  superior R shoulder aching.      HEP compliance/understanding:  yes    Objective:   Objective Measurements:    ROM:   PROM R shoulder Supine  ER:  0/35    Treatment:   **= HEP  NV= Next visit  np= not performed  nb= non-billable  G= group HEP= discharged to HEP  Therapeutic Exercise:     30 minutes  Nu-step:  soft tissue warm up and subjective taken:  Lev 1 x 8' ( R shoulder PROM only)  Pendulums all planes: x 20 ea.   Pulleys flex/elev:  x 30 ea.   Cane ER: elbow at side and bent to 90:  15/5\"  Cane AAROM elevation standing:   1 x 15**  Cane AAROM flexion in supine:    2 x 10 **  R shoulder IR/ER isometrics:  15/5\"**    Manual Therapy:     15 minutes  Grade II/III joint mobs R shoulder caudal and distraction glides and PROM all planes supine with bolster.     Neuromuscular Re-education:    5 minutes  Scapular pinches:  20/5\"**  Retro shoulder rolls: x 20 NP    Modalities:       Electrical Stimulation        15 minutes  IFC/CP to R shoulder seated x 15'//40%/Intensity: sensory stim, patient given controller/seated with pillow under axilla     Education:  exercise and HEP progression  HEP Progression:    N/a  "

## 2024-07-08 ENCOUNTER — APPOINTMENT (OUTPATIENT)
Dept: ENDOCRINOLOGY | Facility: CLINIC | Age: 34
End: 2024-07-08
Payer: COMMERCIAL

## 2024-07-08 ENCOUNTER — TREATMENT (OUTPATIENT)
Dept: PHYSICAL THERAPY | Facility: CLINIC | Age: 34
End: 2024-07-08
Payer: COMMERCIAL

## 2024-07-08 VITALS
BODY MASS INDEX: 38.89 KG/M2 | TEMPERATURE: 98.7 F | HEART RATE: 75 BPM | DIASTOLIC BLOOD PRESSURE: 78 MMHG | SYSTOLIC BLOOD PRESSURE: 119 MMHG | HEIGHT: 65 IN | WEIGHT: 233.4 LBS

## 2024-07-08 DIAGNOSIS — E66.8 CONSTITUTIONAL OBESITY: ICD-10-CM

## 2024-07-08 DIAGNOSIS — Z76.89 ENCOUNTER FOR WEIGHT MANAGEMENT: ICD-10-CM

## 2024-07-08 DIAGNOSIS — M75.21 BICEPS TENDINITIS OF RIGHT SHOULDER: ICD-10-CM

## 2024-07-08 DIAGNOSIS — Z98.890 STATUS POST SUBACROMIAL DECOMPRESSION: Primary | ICD-10-CM

## 2024-07-08 PROCEDURE — 3008F BODY MASS INDEX DOCD: CPT | Performed by: INTERNAL MEDICINE

## 2024-07-08 PROCEDURE — 97140 MANUAL THERAPY 1/> REGIONS: CPT | Mod: GP

## 2024-07-08 PROCEDURE — 97112 NEUROMUSCULAR REEDUCATION: CPT | Mod: GP

## 2024-07-08 PROCEDURE — 97110 THERAPEUTIC EXERCISES: CPT | Mod: GP

## 2024-07-08 PROCEDURE — 99204 OFFICE O/P NEW MOD 45 MIN: CPT | Performed by: INTERNAL MEDICINE

## 2024-07-08 NOTE — PROGRESS NOTES
Physical Therapy  Physical Therapy Progress Note    Patient Name Adrianne Rodríguez   MRN: [id number]  Today's Date: 7/8/2024  Time Calculation  Start Time: 0845  Stop Time: 0938  Time Calculation (min): 53 min    Insurance:    Visit #:   5   [facility]  -authorization required: yes  -number of visits authorized: 24  -Authorization dates: 6-24-24/9-6-24    Therapy Diagnoses:   Problem List Items Addressed This Visit             ICD-10-CM    Biceps tendinitis of right shoulder M75.21    Status post subacromial decompression - Primary Z98.890     General:  Reason for visit: s/p R shoulder subacromial decompression and  biceps tenodesis, extensive debridement  Referred by: Dr MCKENZIE Charles MD appt:  7/26/24  Preferred Name:  Victoria  Script:  eval and treat, pt brought in the doctor's protocol  Onset Date:  5/23/24  Email/text:  [phone]    Assessment:  Patient tolerated the new exercises well.  She continues to have some pain in the AC joint region, especially with shoulder flexion and elevation.  PROM continues to slowly improve.  Patient needs continued work on/skilled PT for: ROM and strength as well as scapular stability to address remaining functional, objective and subjective deficits to allow them to return to prior /optimal level of function with ADLs.  Patient is progressing with goals: ipain reduction, ROM, flexibility, strength, HEP, posture   Skilled care:  therapeutic exercise, NMR, manual techniques, pt education/ HEP    Plan:    Continue to progress per poc:   Add shoulder flexion and abduction to 90 degrees NV    Subjective:   Patient reports:  Her pain level has remained about the same.    Have you fallen since last visit:  no    Precautions:   no fall risk  Current Medical management:     PMHx: n/a     Medications for pain: tylenol, meloxicam, oxycodone     Diagnostic Tests: MRI    Pain:  4-5/10  Location/Type of pain:  superior R shoulder aching.      HEP compliance/understanding:   "yes    Objective:   Objective Measurements:    ROM:     Flexion supine ~ 120 degrees  ER at 90 abduction:  0/35    Treatment:   **= HEP  NV= Next visit  np= not performed  nb= non-billable  G= group HEP= discharged to HEP  Therapeutic Exercise:     30 minutes  Nu-step:  soft tissue warm up and subjective taken:  Lev 2 x 8' ( R shoulder PROM only)  Pro II NV  Pendulums all planes: x 20 ea.   Pulleys flex/elev:  x 30 ea.   Cane ER: elbow at side and bent to 90:  15/5\"  Cane AAROM elevation standing:   1 x 10**  Cane AAROM flexion standing: 1 x 10 **  Cane AAROM extension standing: 1 x 10 **  Towel IR stretch: 5\" x 10**  Cane AAROM flexion in supine:    2 x 10 **  AROM shoulder flexion to 90: NV  AROM shoulder abduction to 90: NV    Manual Therapy:     15 minutes  Grade II/III joint mobs R shoulder caudal and distraction glides and PROM all planes supine with bolster.     Neuromuscular Re-education:    8 minutes  T-band rows: NV  Retro shoulder rolls: x 20 NP  T-band IR: yellow 10 x 1 *  T-band ER: yellow 10 x 1 **    Modalities:       Electrical Stimulation          minutes  IFC/CP to R shoulder seated x 15'//40%/Intensity: sensory stim, patient given controller/seated with pillow under axilla NP    Education:  exercise and HEP progression  HEP Progression:    Access Code: [email or web address]  URL: [email or web address]  Date: 07/08/2024  Prepared by: Soledad Hagen    Exercises  - Shoulder Flexion Overhead with Dowel  - 1-2 x daily - 7 x weekly - 1 sets - 5-10 reps - 5 hold  - Standing Shoulder Extension with Dowel  - 1-2 x daily - 7 x weekly - 1-2 sets - 5-10 reps - 5 hold  - Standing Shoulder Internal Rotation Stretch with Towel  - 1-2 x daily - 7 x weekly - 1-2 sets - 10 reps - 5-10 hold  - Standing Shoulder Internal Rotation with Anchored Resistance  - 1-2 x daily - 7 x weekly - 1-3 sets - 10 reps  - Shoulder External Rotation with Anchored Resistance  - 1-2 x daily - 7 x weekly - 1-3 sets - " 10-15 reps

## 2024-07-08 NOTE — PROGRESS NOTES
Patient is seen at the request of Dr. Quiros for my opinion regarding weight management. My final recommendations will be communicated back to the requesting provider by way of shared medical record.    NEW  Subjective   Adrianne Rodríguez is a 33 y.o. female with a hx of HLD, GERD, IBS, anxiety and depression, RLS who presents for weight management and obesity.    1. Weight history: Has struggled with weight the last 9 years.   --Any previous programs: WW in the past- able to lose 10lb    2. Sleep: stable      3. Stress: 8/10, has 3 children, adopted 2 nieces/nephews/ // in school for masters in health administration      4. Exercise: had shoulder surgery 5/27- not able to exercise      5. Appetite control: uncontrolled, emotional eater  Breakfast: protein pancakes/eggs with redd  Lunch: usually skips  Dinner: protein with veggie and carb  Snacks: fruit/chocolate/cookie    Any personal history of pancreatitis?: no  Any personal or family history of medullary thyroid cancer or MEN (multiple endocrine neoplasia)?: no    --she was on ?adipex years ago      Current Outpatient Medications:     acetaminophen (Tylenol) 500 mg tablet, Take 2 tablets (1,000 mg) by mouth every 8 hours if needed for mild pain (1 - 3) (Take 2 tablets every 8 hours for 5 days, then after 5 days take 2 tablets as needed for pain)., Disp: 90 tablet, Rfl: 0    calcium carbonate-vitamin D3 500 mg-5 mcg (200 unit) tablet, Take 1 tablet by mouth once daily., Disp: , Rfl:     ferrous sulfate 300 mg (60 mg iron)/5 mL syrup, Take 5 mL (60 mg of iron) by mouth once daily., Disp: , Rfl:     fluticasone (Flonase) 50 mcg/actuation nasal spray, Administer 2 sprays into each nostril once daily., Disp: , Rfl:     loratadine (Claritin) 10 mg tablet, Take 1 tablet (10 mg) by mouth once daily., Disp: , Rfl:     omeprazole OTC (PriLOSEC OTC) 20 mg EC tablet, Take 1 tablet (20 mg) by mouth once daily in the morning. Take before meals.  Do not crush, chew, or split., Disp: , Rfl:     ROS:  System: normal  Eyes : no visual changes  Neck : no tenderness, no new lumps/bumps  Respiratory : no SOB  Cardiovascular : no chest pain, no palpitations  Gastro-Intestinal : no abdominal concerns  Neurological : no numbness or tingling in the extremities  Musculoskeletal : no joint paint, no muscle pain  Skin : no unusual rashes  Psychiatric : no depression, no anxiety  See [facility] for Endocrine ROS    Past Medical History:   Diagnosis Date    14 weeks gestation of pregnancy ([facility]) 06/02/2021    14 weeks gestation of pregnancy    16 weeks gestation of pregnancy ([facility]) 06/16/2021    16 weeks gestation of pregnancy    20 weeks gestation of pregnancy ([facility]) 07/14/2021    20 weeks gestation of pregnancy    24 weeks gestation of pregnancy ([facility]) 08/30/2021    24 weeks gestation of pregnancy    28 weeks gestation of pregnancy ([facility]) 09/09/2021    28 weeks gestation of pregnancy    33 weeks gestation of pregnancy ([facility]) 10/13/2021    33 weeks gestation of pregnancy    35 weeks gestation of pregnancy ([facility]) 10/28/2021    35 weeks gestation of pregnancy    Abnormal findings on diagnostic imaging of other specified body structures 04/17/2020    Abnormal CAT scan    Abnormal level of blood mineral 08/25/2020    Low iron stores    Acute bronchospasm 09/26/2019    Acute bronchospasm    Biceps tendinitis of right shoulder     Body mass index (BMI)40.0-44.9, adult 02/10/2021    Body mass index (BMI) of 40.0 to 44.9 in adult    Encounter for immunization 10/13/2021    Need for Tdap vaccination    Encounter for pregnancy test, result positive ([facility]) 03/27/2021    Positive urine pregnancy test    Encounter for routine postpartum follow-up ([facility]) 12/15/2021    Encounter for postpartum visit    Encounter for supervision of other normal pregnancy, third trimester ([facility]) 10/28/2021    Encounter for supervision of normal pregnancy in multigravida in third  trimester    Impingement syndrome of right shoulder     Inappropriate diet and eating habits 2020    Inappropriate diet and eating habits    Labral tear of shoulder, right, initial encounter     Major depressive disorder, recurrent, unspecified (CMS-HCC) 01/15/2019    Major depressive disorder, recurrent episode with anxious distress    Mastodynia 2020    Pain of left breast    Nonpurulent mastitis associated with the puerperium ([facility]) 2020    Mastitis, postpartum    Obesity, unspecified 2019    Obesity (BMI 30-39.9)    Other allergy status, other than to drugs and biological substances 2020    History of environmental allergies    Other conditions influencing health status 2021    History of pregnancy    Other malaise 2020    Malaise and fatigue    Other specified abnormal findings of blood chemistry 2020    Abnormal TSH    Pain in right knee     Bilateral knee pain    Pain in right knee 2021    Knee pain, right    Pain in unspecified hip 2019    Hip pain    Papillomavirus as the cause of diseases classified elsewhere     HPV in female    Personal history of other complications of pregnancy, childbirth and the puerperium 2021    History of threatened     Personal history of other diseases of the female genital tract     History of abnormal cervical Papanicolaou smear    Personal history of other drug therapy 10/13/2021    History of influenza vaccination    Personal history of other specified conditions 2020    History of syncope    Personal history of other specified conditions 02/10/2021    History of flank pain    Personal history of other specified conditions 2022    History of vertigo    Personal history of other specified conditions 2019    History of palpitations    RLS (restless legs syndrome)     Scotoma involving central area, unspecified eye 2020    Visual field scotoma    Secondary amenorrhea 2021     Amenorrhea, secondary    Unspecified condition associated with female genital organs and menstrual cycle 2022    Vaginal discomfort    Unspecified visual disturbance 2020    Vision disturbance    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    Vertigo     Vomiting of pregnancy, unspecified ([facility] 2021    Pregnancy, excessive vomiting       Past Surgical History:   Procedure Laterality Date    OTHER SURGICAL HISTORY  2020     section    OTHER SURGICAL HISTORY  2021    Colposcopy    OTHER SURGICAL HISTORY  2021    Breast surgery    OTHER SURGICAL HISTORY  2021    Cervical loop electrosurgical excision    OTHER SURGICAL HISTORY  2022    Tubal ligation       Social History     Socioeconomic History    Marital status:      Spouse name: Not on file    Number of children: Not on file    Years of education: Not on file    Highest education level: Not on file   Occupational History    Not on file   Tobacco Use    Smoking status: Never    Smokeless tobacco: Never   Vaping Use    Vaping status: Never Used   Substance and Sexual Activity    Alcohol use: Yes     Comment: occasional    Drug use: Never    Sexual activity: Defer   Other Topics Concern    Not on file   Social History Narrative    Not on file     Social Determinants of Health     Financial Resource Strain: Not on file   Food Insecurity: Not on file   Transportation Needs: Not on file   Physical Activity: Not on file   Stress: Not on file   Social Connections: Not on file   Intimate Partner Violence: Not on file   Housing Stability: Not on file       Objective    Physical Exam:  There were no vitals taken for this visit.  General : alert and oriented X3, no acute distress  Eyes : EOMI   Neck : non tender, supple  Thyroid : no palpable nodules  Heart : regular rate and rhythm  ABD : no obvious abnormalities, soft to palpation  Extremities : no edema  Neuro : normal  Other  :    Assessment/Plan   Adrianne Rodríguez is a 33 y.o. female with a hx of HLD, GERD, IBS, anxiety and depression, RLS who presents for weight management and obesity.    1. Weight Management : Reviewed the principles of energy metabolism, caloric intake and expenditure, and rationale for a treatment program.  Also reinforced need for reduced calorie, low fat diet and increased physical activity.    We reviewed the possibility of starting an interdisciplinary lifestyle intervention program involving improvement of the diet, a personalized exercise program, efforts to reduce the stress and the possibility of using appetite suppressant medications in an effort to help with the weight loss process.  The patient expressed interest in the plan.    2. Nutrition : I discussed trying one of the diet approaches we have here in the program : Mediterranean lifestyle, ketosis diet.  The patient will be referred to the Registered Dietician for education on their diet of choice.  The dietary booklet was provided in the visit today.    7/8/24: 233lb, 38.84kg/m2    3. Sleep : stable    4. Stress : 8/10, has 3 children, adopted 2 nieces/nephews/ // in school for masters in health administration      5. Exercise : had shoulder surgery 5/27- not able to exercise      6. Appetite : discussed AOM's  4/11/24: [id number]=4.9%    Follow up in a group visit.  Laura Espinal MD

## 2024-07-10 RX ORDER — SEMAGLUTIDE 0.25 MG/.5ML
0.25 INJECTION, SOLUTION SUBCUTANEOUS
Qty: 2 ML | Refills: 1 | Status: SHIPPED | OUTPATIENT
Start: 2024-07-10

## 2024-07-10 NOTE — TELEPHONE ENCOUNTER
LOV: 7/8/24  NOV: 2/5/25  Patient would like to try Wegovy  If Wegovy is not approved with a PA- patient willing to try Phentermine

## 2024-07-11 ENCOUNTER — DOCUMENTATION (OUTPATIENT)
Dept: ENDOCRINOLOGY | Facility: CLINIC | Age: 34
End: 2024-07-11

## 2024-07-11 ENCOUNTER — TREATMENT (OUTPATIENT)
Dept: PHYSICAL THERAPY | Facility: CLINIC | Age: 34
End: 2024-07-11
Payer: COMMERCIAL

## 2024-07-11 DIAGNOSIS — Z98.890 STATUS POST SUBACROMIAL DECOMPRESSION: Primary | ICD-10-CM

## 2024-07-11 DIAGNOSIS — M75.21 BICEPS TENDINITIS OF RIGHT SHOULDER: ICD-10-CM

## 2024-07-11 PROCEDURE — 97014 ELECTRIC STIMULATION THERAPY: CPT | Mod: GP,CQ

## 2024-07-11 PROCEDURE — 97140 MANUAL THERAPY 1/> REGIONS: CPT | Mod: GP,CQ

## 2024-07-11 PROCEDURE — 97110 THERAPEUTIC EXERCISES: CPT | Mod: GP,CQ

## 2024-07-11 PROCEDURE — 97112 NEUROMUSCULAR REEDUCATION: CPT | Mod: GP,CQ

## 2024-07-11 NOTE — PROGRESS NOTES
Wegovy PA completed  Not covered by plan  [employer] message sent to patient notifying of denial

## 2024-07-11 NOTE — PROGRESS NOTES
Physical Therapy  Physical Therapy Progress Note    Patient Name Adrianne Rodríguez   MRN: [id number]  Today's Date: 7/11/2024  Time Calculation  Start Time: 0130  Stop Time: 0225  Time Calculation (min): 55 min    Insurance:    Visit #:  6   [place], 100%  -authorization required: yes  -number of visits authorized: 24  -Authorization dates: 6-24-24/9-6-24     Therapy Diagnoses:   1. Status post subacromial decompression        2. Biceps tendinitis of right shoulder  Follow Up In Physical Therapy          General:  Reason for visit: s/p R shoulder subacromial decompression and  biceps tenodesis, extensive debridement  Referred by: Dr MCKENZIE Charles MD appt:  7/26/24  Preferred Name:  Victoria  Script:  eval and treat, pt brought in the doctor's protocol  Assessment:  Patient tolerated treatment: well , Patient is 7 weeks post op. Progressed with active  and resistive motion.Performs exercise with  good form.  Patient needs continued work on/ shoulder motion,stability and strength, skilled PT for: progression of exercise and  to address remaining functional, objective and subjective deficits to allow them to return to prior /optimal level of function with ADLs.  Patient is progressing with goals: yes  Skilled care:  manual    Plan:         Continue to progress per poc:   NV add high and low rows     Subjective:   Patient reports:  she is doing well with her HEP    Have you fallen since last visit:  no    Precautions:   no fall risk  Current Medical management:     PMHx: n/a     Medications for pain: tylenol, meloxicam, oxycodone     Diagnostic Tests: MRI     Pain:  4-5/10  Location/Type of pain:  superior R shoulder aching.       HEP compliance/understanding:  yes    Objective:   Objective Measurements:    Function:   difficulty with over head and behind he back  :   ROM:  seated flexion 130 degrees. Demonstrated  some substitution with shoulder flexion past 70 degree standing    Strength: progressed with bands in  "all planes       Treatment:   **= HEP  NV= Next visit  np= not performed  nb= non-billable  G= group HEP= discharged to HEP      Therapeutic Exercise:     15 minutes  Nu-step:  soft tissue warm up and subjective taken:  Lev 2 x 8' ( R shoulder PROM only)  Pro II NV  Pendulums all planes: x 20 ea.   Pulleys flex/elev:  x 30 ea.   Cane ER: elbow at side and bent to 90:  15/5\"  Cane AAROM elevation standing:   1 x 10**  Cane AAROM flexion standing: 1 x 10 **  Cane AAROM extension standing: 1 x 10 **  Towel IR stretch: 5\" x 10**  Cane AAROM flexion in supine:    2 x 10 **  Standing abd/scaption/flex 8x 2 **(within her range and no substitution)      Neuromuscular Re-education:    15 minutes  T-band rows:  green 15x 2  **  Retro shoulder rolls: x 20 NP  T-band IR: /ER/FLEX/ EXT  red10 x 2 *       Manual Therapy:     10 minutes  Grade II/III joint mobs R shoulder caudal and distraction glides and PROM all planes supine with bolster.        Modalities:         Electrical Stimulation        15 minutes  FC/CP to R shoulder seated x 15'//40%/Intensity: sensory stim, patient given controller/seated with pillow under axilla NP       S        Education:  ex progression with POC  HEP Progression:  tband flex/ext, rows, standing flex/scaption/ abd   "

## 2024-07-17 ENCOUNTER — APPOINTMENT (OUTPATIENT)
Dept: PRIMARY CARE | Facility: CLINIC | Age: 34
End: 2024-07-17
Payer: COMMERCIAL

## 2024-07-17 ENCOUNTER — TREATMENT (OUTPATIENT)
Dept: PHYSICAL THERAPY | Facility: CLINIC | Age: 34
End: 2024-07-17
Payer: COMMERCIAL

## 2024-07-17 DIAGNOSIS — M75.21 BICEPS TENDINITIS OF RIGHT SHOULDER: ICD-10-CM

## 2024-07-17 DIAGNOSIS — Z98.890 STATUS POST SUBACROMIAL DECOMPRESSION: Primary | ICD-10-CM

## 2024-07-17 PROCEDURE — 97140 MANUAL THERAPY 1/> REGIONS: CPT | Mod: GP

## 2024-07-17 PROCEDURE — 97110 THERAPEUTIC EXERCISES: CPT | Mod: GP

## 2024-07-17 NOTE — PROGRESS NOTES
Physical Therapy  Physical Therapy Progress Note    Patient Name Adrianne Rodríguez   MRN: [id number]  Today's Date: 7/17/2024  Time Calculation  Start Time: 1135  Stop Time: 1223  Time Calculation (min): 48 min    Insurance:    Visit #:   7   [facility]  -authorization required: yes  -number of visits authorized: 24  -Authorization dates: 6-24-24/9-6-24    Therapy Diagnoses:   Problem List Items Addressed This Visit             ICD-10-CM    Biceps tendinitis of right shoulder M75.21    Status post subacromial decompression - Primary Z98.890     General:  Reason for visit: s/p R shoulder subacromial decompression and  biceps tenodesis, extensive debridement  Referred by: Dr MCKENZIE Charles MD appt:  7/26/24  Preferred Name:  Victoria  Script:  eval and treat, pt brought in the doctor's protocol  Onset Date:  5/23/24  Email/text:  [id number]    Assessment:  Patient tolerated the new exercises well.  She continues to have some pain in the AC joint region, especially with shoulder flexion and elevation, she was instructed on scar massage in this area. PROM continues to slowly improve, but is most limited into flexion and ER.   Patient needs continued work on/skilled PT for: ROM and strength as well as scapular stability to address remaining functional, objective and subjective deficits to allow them to return to prior /optimal level of function with ADLs.  Patient is progressing with goals: ipain reduction, ROM, flexibility, strength, HEP, posture   Skilled care:  therapeutic exercise, NMR, manual techniques, pt education/ HEP    Plan:    Continue to progress per poc:   Add t-band extension, adduction NV    Subjective:   Patient reports:  Her pain level has remained about the same.  She continues to have pain in the AC joint region, especially with palpation and with elevation.    Have you fallen since last visit:  no    Precautions:   no fall risk  Current Medical management:     PMHx: n/a     Medications for pain:  "tylenol, meloxicam, oxycodone     Diagnostic Tests: MRI    Pain:  4-5/10  Location/Type of pain:  superior R shoulder aching.      HEP compliance/understanding:  yes    Objective:   Objective Measurements:    N/a    Treatment:   **= HEP  NV= Next visit  np= not performed  nb= non-billable  G= group HEP= discharged to HEP  Therapeutic Exercise:     25 minutes  Pro II L1 2'/2' forward/backward  Pendulums all planes: x 20 ea. NP  Pulleys flex/elev:  x 30 ea.   Cane ER: elbow at side and bent to 90:  15/5\"  Cane AAROM elevation standing:   1 x 10** NP  Cane AAROM flexion standing: 1 x 10 ** NP  Cane AAROM extension standing: 1 x 10 ** NP  Towel IR stretch: 5\" x 10**  Cane AAROM flexion in supine:    2 x 10 ** NP  AROM shoulder flexion to 90: x 10 **  AROM shoulder abduction to 90: x 10 **    Manual Therapy:     18 minutes  Grade II/III joint mobs R shoulder caudal and distraction glides and PROM all planes supine with bolster.     Neuromuscular Re-education:    5 minutes  T-band rows: red 10 x 2 **  Retro shoulder rolls: x 20 NP  T-band IR: yellow 10 x 1 * NP  T-band ER: yellow 10 x 1 ** NP    Modalities:       Electrical Stimulation          minutes  IFC/CP to R shoulder seated x 15'//40%/Intensity: sensory stim, patient given controller/seated with pillow under axilla NP    Education:  exercise and HEP progression  HEP Progression:    Access Code: [email or web address]  URL: [email or web address]  Date: 07/17/2024  Prepared by: Soledad Hagen    Exercises  - Standing Shoulder Flexion to 90 Degrees  - 1-2 x daily - 7 x weekly - 1-2 sets - 10 reps  - Shoulder Abduction - Thumbs Up  - 1-2 x daily - 7 x weekly - 1-2 sets - 10 reps  - Standing Bilateral Low Shoulder Row with Anchored Resistance  - 1-2 x daily - 7 x weekly - 2-3 sets - 10 reps - 2 hold  "

## 2024-07-19 RX ORDER — PHENTERMINE AND TOPIRAMATE 3.75; 23 MG/1; MG/1
CAPSULE, EXTENDED RELEASE ORAL
Qty: 14 CAPSULE | Refills: 0 | Status: SHIPPED | OUTPATIENT
Start: 2024-07-19

## 2024-07-19 RX ORDER — PHENTERMINE AND TOPIRAMATE 7.5; 46 MG/1; MG/1
CAPSULE, EXTENDED RELEASE ORAL
Qty: 30 CAPSULE | Refills: 3 | Status: SHIPPED | OUTPATIENT
Start: 2024-07-19

## 2024-07-22 ENCOUNTER — APPOINTMENT (OUTPATIENT)
Dept: PHYSICAL THERAPY | Facility: CLINIC | Age: 34
End: 2024-07-22
Payer: COMMERCIAL

## 2024-07-22 ENCOUNTER — DOCUMENTATION (OUTPATIENT)
Dept: PHYSICAL THERAPY | Facility: CLINIC | Age: 34
End: 2024-07-22
Payer: COMMERCIAL

## 2024-07-22 ENCOUNTER — TELEPHONE (OUTPATIENT)
Dept: ENDOCRINOLOGY | Facility: CLINIC | Age: 34
End: 2024-07-22
Payer: COMMERCIAL

## 2024-07-22 NOTE — PROGRESS NOTES
Physical Therapy                 Therapy Communication Note    Patient Name: Adrianne Rodríguez  MRN: [id number]  Today's Date: 7/22/2024     Discipline: Physical Therapy    Missed Visit Reason:  canceled in system    Missed Time: Cancel    Comment:

## 2024-07-23 ENCOUNTER — APPOINTMENT (OUTPATIENT)
Dept: ENDOCRINOLOGY | Facility: CLINIC | Age: 34
End: 2024-07-23
Payer: COMMERCIAL

## 2024-07-23 VITALS — WEIGHT: 233 LBS | HEIGHT: 65 IN | BODY MASS INDEX: 38.82 KG/M2

## 2024-07-23 DIAGNOSIS — Z71.3 DIETARY COUNSELING: Primary | ICD-10-CM

## 2024-07-23 PROCEDURE — 97802 MEDICAL NUTRITION INDIV IN: CPT | Performed by: DIETITIAN, REGISTERED

## 2024-07-23 NOTE — PATIENT INSTRUCTIONS
- Please refer to your book entitled: Your Mediterranean Meal Plan, and follow Mediterranean Diet eating guidelines as reviewed.  - The Healthy Plate style of eating can be a helpful tool for incorporating healthy balanced meals in appropriate portions. (Healthy Plate: Start with a 9-inch diameter plate. Fill 1/2 the plate with non-starchy vegetables, 1/4 of the plate with whole grains or starchy vegetables, and 1/4 of the plate with a lean source of protein.   - Consider pre-planning healthy meals for the week. Refer to your book for both menu and recipe ideas to get you started.  - Incorporate strategies of mindful eating every day. Practice staying in tune with your body's hunger cues and eat only when truly hungry. Avoid emotional eating/eating when not hungry.  - Continue to track daily food intake for accountability with food choices and portions following the Weight Watchers program.   - As discussed however, some foods are counted as 'free' on the Weight Watchers program so please aim for 20-30 grams of protein at main meals and no more than this, add increased non-starchy vegetables but limit starchy vegetables to 1/2 cup portions at a time/meal, and aim for no more than 2-3 servings of fruit a day.   - Aim for 64 ounces of water daily.  - Consider for now adding in daily walking for exercise while awaiting medical clearance for further activity.  - Follow-up as scheduled for the group classes with Dr. Rojas Espinal.  - Follow-up with nutrition in August.

## 2024-07-23 NOTE — PROGRESS NOTES
"Initial Nutrition Assessment    Patient was referred to nutrition by Dr. Rojas Espinal for weight management/desire to lose weight. Other PMHX significant for HLD, GERD and IBS.    Diet recall reveals a meal pattern with 2 meals daily and 1-2 snacks with recent implementation of Weight Watcher's program and limiting snack foods/sweets, and if maintained with proper implementation of portion control, weight loss likely to be achieved. Fluids meeting recommendations in type and amount with water as primary beverage. Pt is not incorporating consistent physical activity at this time and would likely benefit from increased structured activity to assist in achieving goals. See all interventions/recommendations below as discussed during visit this day.      Patient reported symptoms: Difficulty losing weight    Anthropometrics:  Height:   Ht Readings from Last 1 Encounters:   07/08/24 1.651 m (5' 5\")      Weight:   Wt Readings from Last 10 Encounters:   07/08/24 106 kg (233 lb 6.4 oz)   06/07/24 104 kg (229 lb)   05/23/24 104 kg (229 lb 4.5 oz)   05/17/24 105 kg (231 lb 7.7 oz)   04/10/24 104 kg (230 lb)   01/29/24 105 kg (232 lb)   01/19/24 107 kg (236 lb)   01/10/24 109 kg (240 lb)   01/15/24 107 kg (236 lb)   11/01/23 108 kg (237 lb 10.5 oz)      Current BMI:   BMI Readings from Last 1 Encounters:   07/08/24 38.84 kg/m²        Labs:  Lab Results   Component Value Date    HGBA1C 4.9 04/11/2024      Lab Results   Component Value Date    CHOL 170 04/11/2024    CHOL 180 09/22/2023     Lab Results   Component Value Date    HDL 43.5 04/11/2024    HDL 41.0 09/22/2023     Lab Results   Component Value Date    LDLCALC 112 (H) 04/11/2024     Lab Results   Component Value Date    TRIG 71 04/11/2024    TRIG 75 09/22/2023       Malnutrition Screening:  Significant Unintentional weight loss: No  Eating less than 75% of usual intake for more than 2 weeks: No  Potential Signs of Inflammation: No    Recommended Malnutrition " Diagnosis: No malnutrition identified    Diet Recall-  Breakfast (10-10:30 AM)- protein pancakes OR eggs with redd   Lunch- skips due to late breakfast and has a snack instead or nothing at all   Dinner (4-5 PM)- various proteins, starches and vegetables  Daily Snacks- lunch meat sandwich OR a salad OR granola bar   Beverages- water and zero sugar drinks, coffee or tea  Alcohol- none  Physical Activity- limited at this time secondary to s/p shoulder surgery    Other pertinent patient reported Information:  - Previous weight loss attempts include self-directed dieting and exercise with success  - Gained weight s/p recent shoulder surgery  - Following the weight watchers program at this time  - Recently started on medication Qsymia and feels it is helping     - Barriers with weight loss sometimes involve emotional eating  - Recently reorganized the kitchen to move sweets out of sight.    Nutrition Diagnosis: Food and nutrition-related knowledge deficit related to lack of recent exposure to information as evidenced by BMI above normative standard for age and gender.    Readiness to Learn:  Cognitive ability: Alert and oriented  Motivation to learn: Interested  Family Support: Unable to assess- family not present  Instruction provided to: Patient  Patient learns best by: Multiple methods  Factors affecting learning: None   Physical limitations affecting learning: None    Education Materials Provided:   Your Mediterranean Meal Plan Booklet provided during recent visit with Dr. Rojas Espinal and reviewed during visit this day.    Nutrition Interventions/Recommendations for 7/23/2024:  - Please refer to your book entitled: Your Mediterranean Meal Plan, and follow Mediterranean Diet eating guidelines as reviewed.  - The Healthy Plate style of eating can be a helpful tool for incorporating healthy balanced meals in appropriate portions. (Healthy Plate: Start with a 9-inch diameter plate. Fill 1/2 the plate with  non-starchy vegetables, 1/4 of the plate with whole grains or starchy vegetables, and 1/4 of the plate with a lean source of protein.   - Consider pre-planning healthy meals for the week. Refer to your book for both menu and recipe ideas to get you started.  - Incorporate strategies of mindful eating every day. Practice staying in tune with your body's hunger cues and eat only when truly hungry. Avoid emotional eating/eating when not hungry.  - Continue to track daily food intake for accountability with food choices and portions following the Weight Watchers program.   - As discussed however, some foods are counted as 'free' on the Weight Watchers program so please aim for 20-30 grams of protein at main meals and no more than this, add increased non-starchy vegetables but limit starchy vegetables to 1/2 cup portions at a time/meal, and aim for no more than 2-3 servings of fruit a day.   - Aim for 64 ounces of water daily.  - Consider for now adding in daily walking for exercise while awaiting medical clearance for further activity.  - Follow-up as scheduled for the group classes with Dr. Rojas Espinal.  - Follow-up with nutrition in August.      Nutrition Monitoring & Evaluation: adherence to recommendations and patient stated goals    Need for follow-up: As scheduled for Dr. Foss's Shared Medical Appointment (SMA) and Individual Nutrition Visit in 4-6 weeks    Referred by: Dr. Rojas Espinal    MNT Billing Type: Medical Nutrition Assessment, each 15 min increment, for 2 increments.    SIGNATURE:   Elaine Reid RD, RODRÍGUEZ, LD, CDCES                                                        DATE:   7/23/2024

## 2024-07-24 ENCOUNTER — TREATMENT (OUTPATIENT)
Dept: PHYSICAL THERAPY | Facility: CLINIC | Age: 34
End: 2024-07-24
Payer: COMMERCIAL

## 2024-07-24 DIAGNOSIS — M75.21 BICEPS TENDINITIS OF RIGHT SHOULDER: ICD-10-CM

## 2024-07-24 PROCEDURE — 97140 MANUAL THERAPY 1/> REGIONS: CPT | Mod: GP

## 2024-07-24 PROCEDURE — 97110 THERAPEUTIC EXERCISES: CPT | Mod: GP

## 2024-07-24 NOTE — PROGRESS NOTES
Physical Therapy  Physical Therapy Progress Note    Patient Name Adrianne Rodríguez   MRN: [id number]  Today's Date: 7/24/2024  Time Calculation  Start Time: 1000  Stop Time: 1045  Time Calculation (min): 45 min    Insurance:    [facility]:   8   [facility]  -authorization required: yes  -number of visits authorized: 24  -Authorization dates: 6-24-24/9-6-24    Therapy Diagnoses:   Problem List Items Addressed This Visit             ICD-10-CM    Biceps tendinitis of right shoulder M75.21     General:  Reason for visit: s/p R shoulder subacromial decompression and  biceps tenodesis, extensive debridement  Referred by: Dr MCKENZIE Charles MD appt:  7/26/24  Preferred Name:  Victoria  Script:  eval and treat, pt brought in the doctor's protocol  Onset Date:  5/23/24  Email/text:  [id number]    Assessment:  The pt had improved PROM/AROM this session. She demonstrated improved strength, especially with shoulder flexion to 90 degrees.  The pt was instructed to focus on endurance with this exercise, increasing reps as long as her form remains good.  She was also instructed to focus on improving ROM until her NV when she returns from vacation.  Patient needs continued work on/skilled PT for: ROM and strength as well as scapular stability to address remaining functional, objective and subjective deficits to allow them to return to prior /optimal level of function with ADLs.  Patient is progressing with goals: ipain reduction, ROM, flexibility, strength, HEP, posture   Skilled care:  therapeutic exercise, NMR, manual techniques, pt education/ HEP    Plan:    Continue to progress per poc:   Continue to assess ROM and strength progression.    Subjective:   Patient reports:  The pt reports she had a massage and that improved her pain level and ROM moderately.     Have you fallen since last visit:  no    Precautions:   no fall risk  Current Medical management:     PMHx: n/a     Medications for pain: tylenol, meloxicam, oxycodone     " Diagnostic Tests: MRI    Pain:  4/10  Location/Type of pain:  superior R shoulder aching.      HEP compliance/understanding:  yes    Objective:   Objective Measurements:    ROM: AAROM flexion 135 degrees,     Treatment:   **= HEP  NV= Next visit  np= not performed  nb= non-billable  G= group HEP= discharged to HEP  Therapeutic Exercise:     25 minutes  Pro II L1 3'/3' forward/backward  Pendulums all planes: x 20 ea. NP  Pulleys flex/elev:  x 20 ea.   Cane ER: elbow at side and bent to 90:  15/5\"  Towel IR stretch: 5\" x 10**  Cane AAROM flexion in supine:    2 x 10 **   AROM shoulder flexion to 90: x 15 **  AROM shoulder abduction to 90: x 10 ** NP    Manual Therapy:     20 minutes  Grade II/III joint mobs R shoulder caudal and distraction glides and PROM all planes supine with bolster.     Neuromuscular Re-education:      minutes  T-band rows: red 10 x 2 **  Retro shoulder rolls: x 20 NP  T-band IR: yellow 10 x 2 **   T-band ER: yellow 10 x 2 **     Modalities:       Electrical Stimulation          minutes  IFC/CP to R shoulder seated x 15'//40%/Intensity: sensory stim, patient given controller/seated with pillow under axilla NP    Education:  exercise and HEP progression  HEP Progression:  reviewed and instructed to focus on AROM into flexion and scaption  "

## 2024-07-26 ENCOUNTER — APPOINTMENT (OUTPATIENT)
Dept: ORTHOPEDIC SURGERY | Facility: CLINIC | Age: 34
End: 2024-07-26
Payer: COMMERCIAL

## 2024-07-26 VITALS — WEIGHT: 232 LBS | HEIGHT: 65 IN | BODY MASS INDEX: 38.65 KG/M2

## 2024-07-26 DIAGNOSIS — M75.21 BICEPS TENDINITIS OF RIGHT SHOULDER: Primary | ICD-10-CM

## 2024-07-26 DIAGNOSIS — S43.431A LABRAL TEAR OF SHOULDER, RIGHT, INITIAL ENCOUNTER: ICD-10-CM

## 2024-07-26 PROCEDURE — 3008F BODY MASS INDEX DOCD: CPT | Performed by: ORTHOPAEDIC SURGERY

## 2024-07-26 PROCEDURE — 99024 POSTOP FOLLOW-UP VISIT: CPT | Performed by: ORTHOPAEDIC SURGERY

## 2024-07-26 PROCEDURE — 1036F TOBACCO NON-USER: CPT | Performed by: ORTHOPAEDIC SURGERY

## 2024-07-26 ASSESSMENT — PAIN - FUNCTIONAL ASSESSMENT: PAIN_FUNCTIONAL_ASSESSMENT: 0-10

## 2024-07-26 ASSESSMENT — PAIN DESCRIPTION - DESCRIPTORS: DESCRIPTORS: TENDER

## 2024-07-26 ASSESSMENT — PAIN SCALES - GENERAL: PAINLEVEL_OUTOF10: 2

## 2024-07-26 NOTE — PROGRESS NOTES
2 months status post right shoulder scope arthroscopic biceps tenodesis.  Patient doing well.    Patients' self reported past medical history, medications, allergies, surgical history, family and social history as well as a 10 point review of systems has been documented in the new patient intake form and scanned into the patient's electronic medical record.  The intake form was reviewed by Dr Sanders during the office visit and signed by Dr. Sanders and the patient.  Pertinent findings are documented in the HPI.    General Multi-System Physical Exam:  Constitutional  General appearance:  Alert, oriented, and in no acute distress.  Well developed, well nourished.  Head and Face  Head and face:  Normocephalic and atraumatic.  Ears, Nose, Mouth, and Throat  External inspection of ears and nose: Normal.  Eyes:  Pupils are equal and round.  Neck  Neck:  no neck mass was observed.  Pulmonary  Respiratory effort:  no respiratory distress.  Cardiovascular  Intact distal pulses.  Lymphatic  Palpation of lymph nodes in the affected extremity:  Normal.  Skin  Skin and subcutaneous tissue:  Normal skin color and pigmentation.  Normal skin turgor.  No rashes.  Neurologic  Sensation:  normal to light touch.  Psychiatric  Judgement and insight:  Intact.  Mood and affect:  Normal.  Musculoskeletal  Right shoulder is 120 degrees of abduction forward flexion 45 degrees of external rotation internal rotates to sacrum well-healed incisions neurovasc intact right upper extremity    Patient doing well.  Will have her continue working on range of motion and strengthening and see me back as needed.  I answered all of her questions

## 2024-07-29 ENCOUNTER — APPOINTMENT (OUTPATIENT)
Dept: PHYSICAL THERAPY | Facility: CLINIC | Age: 34
End: 2024-07-29
Payer: COMMERCIAL

## 2024-07-31 ENCOUNTER — APPOINTMENT (OUTPATIENT)
Dept: PHYSICAL THERAPY | Facility: CLINIC | Age: 34
End: 2024-07-31
Payer: COMMERCIAL

## 2024-08-02 ENCOUNTER — HOSPITAL ENCOUNTER (OUTPATIENT)
Dept: RADIOLOGY | Facility: CLINIC | Age: 34
Discharge: HOME | End: 2024-08-02
Payer: COMMERCIAL

## 2024-08-02 ENCOUNTER — OFFICE VISIT (OUTPATIENT)
Dept: OBSTETRICS AND GYNECOLOGY | Facility: CLINIC | Age: 34
End: 2024-08-02
Payer: COMMERCIAL

## 2024-08-02 VITALS
WEIGHT: 233 LBS | HEIGHT: 65 IN | SYSTOLIC BLOOD PRESSURE: 122 MMHG | BODY MASS INDEX: 38.82 KG/M2 | DIASTOLIC BLOOD PRESSURE: 78 MMHG

## 2024-08-02 DIAGNOSIS — R10.2 PELVIC PAIN: ICD-10-CM

## 2024-08-02 PROCEDURE — 1036F TOBACCO NON-USER: CPT | Performed by: OBSTETRICS & GYNECOLOGY

## 2024-08-02 PROCEDURE — 99213 OFFICE O/P EST LOW 20 MIN: CPT | Performed by: OBSTETRICS & GYNECOLOGY

## 2024-08-02 PROCEDURE — 3008F BODY MASS INDEX DOCD: CPT | Performed by: OBSTETRICS & GYNECOLOGY

## 2024-08-02 ASSESSMENT — PAIN SCALES - GENERAL: PAINLEVEL: 5

## 2024-08-02 NOTE — PROGRESS NOTES
Subjective   Patient ID: Adrianne Rodríguez is a 34 y.o. female who presents for Abdominal Pain (C/o  lower abdominal pain/- severe pain when it happened/- now mostly on the left side/- history of ovarian torsion//Chaperone declined).  HPI  As stated in the chief complaint  Review of Systems  10 systems have been reviewed and are negative and noncontributory to the patient current ailments.    Objective   Physical Exam  Vital signs reviewed    CONSTITUTIONAL- well nourished, well developed, looks like stated age.  She is sitting comfortably on the exam table in no apparent distress    ABDOMEN- soft, non distended, bowel sound normal pitch GENITOURINARY- External genitalia: Normal.                                 - Uterus: AV/AF NSSC, mobile and nontender                                -The adnexal areas are free of tenderness or mass                                -There are no cervical lesions; there is no cervical motion tenderness  Diagnoses and all orders for this visit:  Pelvic pain  -Will check pelvic ultrasound  -Reviewed side effects of semaglutide also reviewed the side effects of phentermine-topiramate       Redd Jones DO 08/02/24 11:48 AM

## 2024-08-03 ENCOUNTER — LAB (OUTPATIENT)
Dept: LAB | Facility: LAB | Age: 34
End: 2024-08-03
Payer: COMMERCIAL

## 2024-08-03 DIAGNOSIS — E61.1 IRON DEFICIENCY: ICD-10-CM

## 2024-08-03 DIAGNOSIS — R10.2 PELVIC PAIN: ICD-10-CM

## 2024-08-03 PROCEDURE — 36415 COLL VENOUS BLD VENIPUNCTURE: CPT

## 2024-08-03 PROCEDURE — 87086 URINE CULTURE/COLONY COUNT: CPT

## 2024-08-05 LAB — BACTERIA UR CULT: NORMAL

## 2024-08-06 ENCOUNTER — OFFICE VISIT (OUTPATIENT)
Dept: PRIMARY CARE | Facility: CLINIC | Age: 34
End: 2024-08-06
Payer: COMMERCIAL

## 2024-08-06 ENCOUNTER — TELEPHONE (OUTPATIENT)
Dept: PRIMARY CARE | Facility: CLINIC | Age: 34
End: 2024-08-06

## 2024-08-06 VITALS
TEMPERATURE: 97.9 F | WEIGHT: 239 LBS | SYSTOLIC BLOOD PRESSURE: 124 MMHG | DIASTOLIC BLOOD PRESSURE: 82 MMHG | BODY MASS INDEX: 39.77 KG/M2

## 2024-08-06 DIAGNOSIS — R19.7 DIARRHEA, UNSPECIFIED TYPE: ICD-10-CM

## 2024-08-06 DIAGNOSIS — R10.32 LEFT LOWER QUADRANT PAIN: Primary | ICD-10-CM

## 2024-08-06 PROBLEM — E66.9 OBESITY: Status: ACTIVE | Noted: 2024-08-06

## 2024-08-06 PROCEDURE — 99214 OFFICE O/P EST MOD 30 MIN: CPT | Performed by: NURSE PRACTITIONER

## 2024-08-06 RX ORDER — DICYCLOMINE HYDROCHLORIDE 10 MG/1
10 CAPSULE ORAL
COMMUNITY
Start: 2024-08-06

## 2024-08-06 RX ORDER — ONDANSETRON 4 MG/1
1 TABLET, ORALLY DISINTEGRATING ORAL EVERY 8 HOURS PRN
COMMUNITY
Start: 2024-08-05

## 2024-08-06 ASSESSMENT — ENCOUNTER SYMPTOMS
FREQUENCY: 0
NAUSEA: 1
ABDOMINAL PAIN: 1
CONSTIPATION: 0
DYSURIA: 0
DIARRHEA: 1
BLOOD IN STOOL: 0
ACTIVITY CHANGE: 1

## 2024-08-06 ASSESSMENT — PATIENT HEALTH QUESTIONNAIRE - PHQ9
1. LITTLE INTEREST OR PLEASURE IN DOING THINGS: NOT AT ALL
SUM OF ALL RESPONSES TO PHQ9 QUESTIONS 1 AND 2: 0
2. FEELING DOWN, DEPRESSED OR HOPELESS: NOT AT ALL

## 2024-08-06 NOTE — PROGRESS NOTES
"Subjective   Patient ID: Adrianne Rodríguez is a 34 y.o. female who presents for Abdominal Pain (Abd pain, very tender, diarrhea, sharp pain on left abd side).    Abdominal Pain  Associated symptoms include diarrhea and nausea. Pertinent negatives include no constipation, dysuria or frequency.      Started during intercourse on 8/1    Sudden sharp left lower quadrant pain  \"So bad\" - 10/10 pain  Had pain - sharp on left side  Brought her to her knees   Spouse had to help her move around.  Back pain and trouble sitting on left buttock  Since.   Had similar pain once from ovary cyst so went to GYN on 8/2  Note with Robert reviewed  Urine Preg NEG, U/S NEG Ultrasound shows two cysts on left and a small amount of fluid   Went to the ER on 8/5 - note reviewed.    Had labs but no imaging.  Still having lots of pain ~ 5/1-10 scale  Now diarrhea several times per day.  Had been at the beach.   No one else with diarrhea and everyone ate the same food.    Review of Systems   Constitutional:  Positive for activity change.   Gastrointestinal:  Positive for abdominal pain, diarrhea and nausea. Negative for blood in stool and constipation.   Genitourinary:  Positive for pelvic pain. Negative for dysuria and frequency.   All other systems reviewed and are negative.      Objective   /82   Temp 36.6 °C (97.9 °F)   Wt 108 kg (239 lb)   LMP 07/05/2024 (Approximate)   BMI 39.77 kg/m²     Physical Exam  Vitals and nursing note reviewed.   Constitutional:       Appearance: She is obese.   HENT:      Head: Normocephalic and atraumatic.      Mouth/Throat:      Pharynx: Oropharynx is clear.   Cardiovascular:      Pulses: Normal pulses.      Heart sounds: Normal heart sounds.   Pulmonary:      Effort: Pulmonary effort is normal.      Breath sounds: Normal breath sounds.   Abdominal:      General: Bowel sounds are normal.      Palpations: Abdomen is soft. There is no mass.      Tenderness: There is abdominal tenderness. There is " guarding. There is no rebound.      Hernia: No hernia is present.   Skin:     General: Skin is warm.   Neurological:      Mental Status: She is oriented to person, place, and time.   Psychiatric:         Mood and Affect: Mood normal.         Behavior: Behavior normal.         Thought Content: Thought content normal.         Judgment: Judgment normal.         Assessment/Plan   Problem List Items Addressed This Visit             ICD-10-CM    Diarrhea R19.7    Relevant Orders    CT abdomen pelvis w IV contrast    Left lower quadrant pain - Primary R10.32    Relevant Orders    CT abdomen pelvis w IV contrast

## 2024-08-06 NOTE — TELEPHONE ENCOUNTER
Pt calling to see if you can change the CT to Stat so she can get it done sooner than 8/23/2024. She doesn't want to wait that long w/ the pain and nausea she is having.

## 2024-08-07 ENCOUNTER — TREATMENT (OUTPATIENT)
Dept: PHYSICAL THERAPY | Facility: CLINIC | Age: 34
End: 2024-08-07
Payer: COMMERCIAL

## 2024-08-07 DIAGNOSIS — M75.21 BICEPS TENDINITIS OF RIGHT SHOULDER: ICD-10-CM

## 2024-08-07 PROCEDURE — 97112 NEUROMUSCULAR REEDUCATION: CPT | Mod: GP,CQ

## 2024-08-07 PROCEDURE — 97110 THERAPEUTIC EXERCISES: CPT | Mod: GP,CQ

## 2024-08-07 PROCEDURE — 97140 MANUAL THERAPY 1/> REGIONS: CPT | Mod: GP,CQ

## 2024-08-07 NOTE — PROGRESS NOTES
Physical Therapy  Physical Therapy Progress Note    Patient Name Adrianne Rodríguez   MRN: [id number]  Today's Date: 8/7/2024  Time Calculation  Start Time: 1006  Stop Time: 1046  Time Calculation (min): 40 min    Insurance:    Visit #:   9   [facility]  -authorization required: yes  -number of visits authorized: 24  -Authorization dates: 6-24-24/9-6-24    Therapy Diagnoses:   Problem List Items Addressed This Visit             ICD-10-CM    Biceps tendinitis of right shoulder M75.21     General:  Reason for visit: s/p R shoulder subacromial decompression and  biceps tenodesis, extensive debridement  Referred by: Dr MCKENZIE Charles MD appt:  7/26/24  Preferred Name:  Victoria  Script:  eval and treat, pt brought in the doctor's protocol  Onset Date:  5/23/24  Email/text:  [id number]    Assessment:  The pt had improved AAROM today Also able to increase tband for strengthening. Issued green tban for HEP and stretches.  Patient needs continued work on/skilled PT for: ROM and strength as well as scapular stability to address remaining functional, objective and subjective deficits to allow them to return to prior /optimal level of function with ADLs.  Patient is progressing with goals: pain reduction, ROM, flexibility, strength, HEP, posture   Skilled care:  therapeutic exercise, NMR, manual techniques, pt education/ HEP    Plan:    Continue to progress per poc:   Continue to assess ROM and strength progression.    Subjective:   Patient reports:  The pt reports her pain is generally a 3-4. Doing good but the hardest things are overhead and behind the back.. Saw her MD and he advised her to do more stretching.    Have you fallen since last visit:  no    Precautions:   no fall risk  Current Medical management:     PMHx: n/a     Medications for pain: tylenol, meloxicam, oxycodone     Diagnostic Tests: MRI    Pain:  3-4/10  Location/Type of pain:  superior R shoulder aching.      HEP compliance/understanding:   "yes    Objective:   Objective Measurements:    ROM:Supine  AAROM flexion 145 degrees,     Treatment:   **= HEP  NV= Next visit  np= not performed  nb= non-billable  G= group HEP= discharged to HEP  Therapeutic Exercise:     20 minutes  Pro II L1 3'/3' forward/backward  Pendulums all planes: x 20 ea. NP  Pulleys flex/elev/IR x 20 ea.   B Shoulder flex with towel 5 sec  10x   Hanging flexion stretch on wall ladder 10 sec 10x  ER door  stretch 10 sec 5x   Cane ER: elbow at side and bent to 90:  15/5\"  Towel IR stretch: 5\" x 10**  Supine AROM flexion in supine:    2 x 10 **   AROM shoulder flexion to 90: x 15 **  AROM shoulder abduction to 90: x 10 ** NP    Manual Therapy:     10 minutes  Grade II/III joint mobs R shoulder caudal and distraction glides and PROM all planes supine with bolster.     Neuromuscular Re-education:    10 minutes  T-band rows: green  10 x 2 **  Retro shoulder rolls: x 20 NP  T-band IR: red 10 x 2 **   T-band ER: red 10 x 2 **     Modalities:       Electrical Stimulation          minutes  IFC/CP to R shoulder seated x 15'//40%/Intensity: sensory stim, patient given controller/seated with pillow under axilla NP    Education:  exercise and HEP progression  HEP Progression:  Flexion and ER stretch, issued GTB  "

## 2024-08-08 ENCOUNTER — HOSPITAL ENCOUNTER (OUTPATIENT)
Dept: RADIOLOGY | Facility: CLINIC | Age: 34
Discharge: HOME | End: 2024-08-08
Payer: COMMERCIAL

## 2024-08-08 ENCOUNTER — TELEPHONE (OUTPATIENT)
Dept: PRIMARY CARE | Facility: CLINIC | Age: 34
End: 2024-08-08

## 2024-08-08 DIAGNOSIS — R19.7 DIARRHEA, UNSPECIFIED TYPE: ICD-10-CM

## 2024-08-08 DIAGNOSIS — R10.32 LEFT LOWER QUADRANT PAIN: ICD-10-CM

## 2024-08-08 PROCEDURE — 2550000001 HC RX 255 CONTRASTS: Performed by: NURSE PRACTITIONER

## 2024-08-08 PROCEDURE — 74177 CT ABD & PELVIS W/CONTRAST: CPT | Performed by: RADIOLOGY

## 2024-08-08 PROCEDURE — 74177 CT ABD & PELVIS W/CONTRAST: CPT

## 2024-08-12 ENCOUNTER — TREATMENT (OUTPATIENT)
Dept: PHYSICAL THERAPY | Facility: CLINIC | Age: 34
End: 2024-08-12
Payer: COMMERCIAL

## 2024-08-12 DIAGNOSIS — M75.21 BICEPS TENDINITIS OF RIGHT UPPER EXTREMITY: Primary | ICD-10-CM

## 2024-08-12 DIAGNOSIS — R05.2 SUBACUTE COUGH: Primary | ICD-10-CM

## 2024-08-12 PROCEDURE — 97112 NEUROMUSCULAR REEDUCATION: CPT | Mod: GP

## 2024-08-12 PROCEDURE — 97110 THERAPEUTIC EXERCISES: CPT | Mod: GP

## 2024-08-12 PROCEDURE — 97140 MANUAL THERAPY 1/> REGIONS: CPT | Mod: GP

## 2024-08-12 NOTE — PROGRESS NOTES
Physical Therapy  Physical Therapy Progress Note    Patient Name Adrianne Rodríguez   MRN: [id number]  Today's Date: 8/12/2024  Time Calculation  Start Time: 0850  Stop Time: 0935  Time Calculation (min): 45 min    Insurance:    Visit #:   10   - [employer], 100%  -authorization required: yes  -number of visits authorized: 24  -Authorization dates: 6-24-24/9-6-24    Therapy Diagnoses:   Problem List Items Addressed This Visit             ICD-10-CM    Biceps tendinitis - Primary M75.20       General:  Reason for visit: s/p R shoulder subacromial decompression and  biceps tenodesis, extensive debridement  Referred by: Dr MCKENZIE Charles MD appt:  7/26/24  Preferred Name:  Victoria  Script:  eval and treat, pt brought in the doctor's protocol  Onset Date:  5/23/24  Email/text:  [id number]    Assessment:  The pt continues to slowly improve with ROM and stength.  She was advised to focus on this and improving endurance for overhead reaching/ activities at home.  Patient needs continued work on/skilled PT for: ROM and strength as well as scapular stability to address remaining functional, objective and subjective deficits to allow them to return to prior /optimal level of function with ADLs.  Patient is progressing with goals: pain reduction, ROM, flexibility, strength, HEP, posture   Skilled care:  therapeutic exercise, NMR, manual techniques, pt education/ HEP    Plan:    Continue to progress per poc:   Continue to assess ROM and strength progression.    Subjective:   The pt reports she continues to slowly improve, but overhead activities are hard.     Have you fallen since last visit:  no    Precautions:   no fall risk  Current Medical management:     PMHx: n/a     Medications for pain: tylenol, meloxicam, oxycodone     Diagnostic Tests: MRI    Pain:  2-3/10  Location/Type of pain:  superior R shoulder aching.      HEP compliance/understanding:  yes    Objective:   Objective Measurements:    ROM n/a    Treatment:   **= HEP   "NV= Next visit  np= not performed  nb= non-billable  G= group HEP= discharged to HEP  Therapeutic Exercise:     12 minutes  Pro II L1 3'/3' forward/backward  Pendulums all planes: x 20 ea. NP  Pulleys flex/elev/IR x 20 ea.   B Shoulder flex with towel 5 sec  10x   Hanging flexion stretch on wall ladder 10 sec 10x  ER door  stretch 10 sec 5x NP  Cane ER: elbow at side and bent to 90:  15/5\"  Towel IR stretch: 5\" x 10**  Supine AROM flexion in supine:    2 x 10 ** NP  AROM shoulder flexion to 90: 2 x 10 **  AROM shoulder scaption to 90: 2 x 10 **     Manual Therapy:     8 minutes  Grade II/III joint mobs R shoulder caudal and distraction glides and PROM all planes supine with bolster.     Neuromuscular Re-education:    25 minutes  T-band rows: green  10 x 2 **  Retro shoulder rolls: x 20 NP  T-band IR: red 10 x 2 **   T-band ER: red 10 x 2 **   T-band horizontal abduction: red 10 x 2 **  T-band horizontal adduction: red 10 x 2 **  T-band shoulder extension: green 10 x 2 **  Prone W, T, Y: 10 x 1 each **    Modalities:       Electrical Stimulation          minutes  IFC/CP to R shoulder seated x 15'//40%/Intensity: sensory stim, patient given controller/seated with pillow under axilla NP    Education:  exercise and HEP progression  HEP Progression:    Access Code: [email or web address]  URL: [email or web address]  Date: 08/13/2024  Prepared by: Soledad Hagen    Exercises  - Prone Scapular Slide with Shoulder Extension  - 4 x weekly - 1-3 sets - 10 reps  - Prone Scapular Retraction Arms at Side  - 4 x weekly - 1-3 sets - 10 reps  - Prone Scapular Retraction Y  - 1 x daily - 7 x weekly - 1-3 sets - 10 reps  - Shoulder Horizontal Abduction with Anchored Resistance  - 4 x weekly - 1-3 sets - 10 reps  - Standing Shoulder Horizontal Adduction with Anchored Resistance  - 4 x weekly - 1-3 sets - 10 reps  - Shoulder Extension with Resistance  - 4 x weekly - 1-3 sets - 10 reps  - Standing Shoulder Scaption  - 1 x " daily - 7 x weekly - 1-3 sets - 10 reps

## 2024-08-14 ENCOUNTER — APPOINTMENT (OUTPATIENT)
Dept: SURGERY | Facility: CLINIC | Age: 34
End: 2024-08-14
Payer: COMMERCIAL

## 2024-08-14 VITALS
DIASTOLIC BLOOD PRESSURE: 82 MMHG | BODY MASS INDEX: 38.65 KG/M2 | SYSTOLIC BLOOD PRESSURE: 111 MMHG | WEIGHT: 232 LBS | OXYGEN SATURATION: 97 % | HEIGHT: 65 IN | HEART RATE: 95 BPM

## 2024-08-14 DIAGNOSIS — K42.9 UMBILICAL HERNIA WITHOUT OBSTRUCTION AND WITHOUT GANGRENE: Primary | ICD-10-CM

## 2024-08-14 PROCEDURE — 3008F BODY MASS INDEX DOCD: CPT | Performed by: SURGERY

## 2024-08-14 PROCEDURE — 99243 OFF/OP CNSLTJ NEW/EST LOW 30: CPT | Performed by: SURGERY

## 2024-08-14 NOTE — PROGRESS NOTES
General Surgery History and Physical    Referring Provider:  FADI Galo      Chief Complaint:  Abdominal pain    History of Present Illness:  This is a 34 y.o. female who presents with 2 weeks of abdominal pain and nausea.  She reports that on , 2 weeks ago, immediately after sexual intercourse she had sudden left lower quadrant pain.  She reports that the pain slowly abated a little, but as it abated she became more constantly nauseous.  She then reports that her entire left hemiabdomen has been tender to palpation.  She reports that she had an episode or 2 of diarrhea a couple weeks ago, but that completely resolved and she has been having normal bowel movements.  She denies any blood in her urine.  She denies any fevers or chills.  She reports that she has followed up with gynecology as imaging demonstrated a left ovarian cyst, but follows up with me as the same imaging demonstrated a tiny umbilical hernia.  Of note, she has never noticed any bulging of her umbilicus, and does not have any pain centered at the umbilicus.    Past Medical History:  Obesity    Past Surgical History:  Tubal ligation   section  Colposcopy  Breast biopsy  Right shoulder surgery    Medications:  Iron  Zofran  Bentyl    Allergies:  No known drug allergies    Family History:  Depression  Substance abuse disorder  Brain cancer  Bipolar disorder    Social History:  .  Works as a teacher.  Denies tobacco and illicits.  Drinks alcohol socially.       Review of Systems:  A complete 12 point review of systems was performed and is negative except as noted in the history of present illness.      Vital Signs:  Vitals:    24 1420   BP: 111/82   Pulse: 95   SpO2: 97%          Physical Exam:  General: No acute distress. Sitting up in bed.   Neuro: Alert and oriented ×3. Follows commands.  Head: Atraumatic  Eyes: Pupils equal reactive to light. Extraocular motions intact.  Ears: Hears normal speaking  voice.  Mouth, Nose, Throat: Mucous membranes moist.  Normal dentition.  Neck: Supple. No appreciable masses.  Chest: No crepitus.  No appreciable scars.  Heart: Regular rate and rhythm.  Lung: Clear to auscultation bilaterally.  Vascular: No carotid bruits.  Palpable radial pulses bilaterally.  Abdomen: Soft. Nondistended.  No appreciable umbilical hernia on physical exam.  Tenderness across the entire left Jw abdominal musculature.  Musculoskeletal: Moves all extremities.  Normal range of motion.  Lymphatic: No palpable lymph nodes.  Skin: No rashes or lesions.  Psychological: Normal affect      Laboratory Values:  CBC:   Lab Results   Component Value Date    WBC 6.5 05/17/2024    RBC 4.20 05/17/2024    HGB 12.7 05/17/2024    HCT 38.1 05/17/2024     05/17/2024       RFP:   Lab Results   Component Value Date     05/17/2024    K 4.2 05/17/2024     05/17/2024    CO2 26 05/17/2024    BUN 14 05/17/2024    CREATININE 0.60 05/17/2024    CALCIUM 9.5 05/17/2024        LFTs:   Lab Results   Component Value Date    PROT 7.0 04/11/2024    ALBUMIN 4.1 04/11/2024    BILITOT 0.6 04/11/2024    ALKPHOS 56 04/11/2024    AST 21 04/11/2024    ALT 25 04/11/2024            Imaging:  I have personally reviewed the images and the radiologist's report.  CT abdomen pelvis: Left ovarian cyst.  Tiny umbilical hernia.      Assessment:  This is a 34 y.o. female who presents with 2 weeks of left Jw abdominal wall tenderness and imaging concerning for an umbilical hernia.  However, clinically she has no evidence of an umbilical hernia either to her or to my physical exam.  We discussed that the umbilical hernia is of no clinical consequence and is secondary only to the nature of the precision of CT scans these days.  We discussed that we do see the ovarian cyst on imaging, but it seems unlikely that it would cause tenderness across her entire left hemiabdominal muscular wall.  We discussed, though, that I could not see any  evidence of colitis being the source of her pain.  We discussed that therefore recommend treating it as abdominal wall strain of the left abdominal musculature.      Plan:  -- Abdominal core stretching  -- Warm compresses  -- Tylenol 650 every 6 hours for 2 weeks  -- Ibuprofen 600 every 6 hours for 2 weeks  -- Nexium over-the-counter daily for 2 weeks  -- Follow-up in 2 to 4 weeks if there is no improvement  -- Return sooner if there is any evidence of an umbilical hernia on physical exam      Lukas Nuñez MD  General Surgery  Office: [phone]  Fax:     [phone]  2:37 PM   08/14/24

## 2024-08-15 ENCOUNTER — TREATMENT (OUTPATIENT)
Dept: PHYSICAL THERAPY | Facility: CLINIC | Age: 34
End: 2024-08-15
Payer: COMMERCIAL

## 2024-08-15 DIAGNOSIS — M75.21 BICEPS TENDINITIS OF RIGHT UPPER EXTREMITY: Primary | ICD-10-CM

## 2024-08-15 DIAGNOSIS — M75.21 BICEPS TENDINITIS OF RIGHT SHOULDER: ICD-10-CM

## 2024-08-15 DIAGNOSIS — Z98.890 STATUS POST SUBACROMIAL DECOMPRESSION: ICD-10-CM

## 2024-08-15 PROCEDURE — 97112 NEUROMUSCULAR REEDUCATION: CPT | Mod: GP | Performed by: PHYSICAL THERAPIST

## 2024-08-15 PROCEDURE — 97140 MANUAL THERAPY 1/> REGIONS: CPT | Mod: GP | Performed by: PHYSICAL THERAPIST

## 2024-08-15 PROCEDURE — 97110 THERAPEUTIC EXERCISES: CPT | Mod: GP | Performed by: PHYSICAL THERAPIST

## 2024-08-15 NOTE — PROGRESS NOTES
Physical Therapy  Physical Therapy Progress Note    Patient Name Adrianne Rodríguez   MRN: [id number]  Today's Date: 8/15/2024  Time Calculation  Start Time: 0900  Stop Time: 0945  Time Calculation (min): 45 min    Insurance:    Visit #:   11   - [facility]  -authorization required: yes  -number of visits authorized: 24  -Authorization dates: 6-24-24/9-6-24    Therapy Diagnoses:   Problem List Items Addressed This Visit             ICD-10-CM    Biceps tendinitis - Primary M75.20    Biceps tendinitis of right shoulder M75.21    Status post subacromial decompression Z98.890     General:  Reason for visit: s/p R shoulder subacromial decompression and  biceps tenodesis, extensive debridement  Referred by: Dr MCKENZIE Charles MD appt:  7/26/24  Preferred Name:  Victoria  Script:  eval and treat, pt brought in the doctor's protocol  Onset Date:  5/23/24  Email/text:  [id number]    Assessment:  Patient tolerated treatment well, did well with progression this date.    Patient needs continued work on/skilled PT for: end PROM flexion/ER and AROM/strength and scapular stability to address remaining functional, objective and subjective deficits to allow them to return to prior /optimal level of function with ADLs.  Patient is progressing with goals: improving ROM  Skilled care:  exercise progression/education/manual    Plan:    Continue to progress per poc:   NV add progression of cable column strength for the R shoulder.     Subjective:   Patient reports:  that she is feeling better overall and she has been released by the MD and does not need to see him again.  Patient reports that she continues to have difficulty with reaching behind her back and head.     Have you fallen since last visit:  no    Precautions:  no fall risk  Current Medical management:     PMHx: n/a     Medications for pain: tylenol, meloxicam, oxycodone     Diagnostic Tests: MRI    Pain:  2/10 generalized R shoulder soreness with some muscle fatigue with  "exercise    HEP compliance/understanding:  yes    Objective:   Objective Measurements:    ROM:  R shoulder AROM standing:  flexion:  0/106  Elevation:  0/100 Apley scratch:  IR:  R back pocket  ER:  back of head  PROM supine  Flex:  0/157  Elevation:  0/180  ER:  0/70    Treatment:   **= HEP  NV= Next visit  np= not performed  nb= non-billable  G= group HEP= discharged to HEP  Therapeutic Exercise:     20 minutes  Pro II L2 4/4' forward/backward   Pulleys flex/elev:  x 30  /IR x 20 ea.  Cane ER AAROM:  x 20/5\"   Cable Column R shoulder ext/add/IR:  NV    Manual Therapy:     8 minutes  Grade IV joint Mobs all planes R shoulder and PROM all planes supine with bolster    Neuromuscular Re-education:    10 minutes  Cable Column Scapular Depression:  50#/ 2 x 10  Cable Column 3 level rows:  80#/ 2 x 10 ea.     Education:  exercise progression    "

## 2024-08-16 ENCOUNTER — APPOINTMENT (OUTPATIENT)
Dept: PRIMARY CARE | Facility: CLINIC | Age: 34
End: 2024-08-16
Payer: COMMERCIAL

## 2024-08-20 ENCOUNTER — DOCUMENTATION (OUTPATIENT)
Dept: PHYSICAL THERAPY | Facility: CLINIC | Age: 34
End: 2024-08-20
Payer: COMMERCIAL

## 2024-08-20 ENCOUNTER — APPOINTMENT (OUTPATIENT)
Dept: PHYSICAL THERAPY | Facility: CLINIC | Age: 34
End: 2024-08-20
Payer: COMMERCIAL

## 2024-08-20 NOTE — PROGRESS NOTES
Physical Therapy                 Therapy Communication Note    Patient Name: Adrianne Rodríguez  MRN: [id number]  Today's Date: 8/20/2024     Discipline: Physical Therapy    Missed Visit Reason:  no reason given    Missed Time: Cancel    Comment:  patient still has two remaining visits this week.

## 2024-08-21 ENCOUNTER — DOCUMENTATION (OUTPATIENT)
Dept: PHYSICAL THERAPY | Facility: CLINIC | Age: 34
End: 2024-08-21

## 2024-08-21 ENCOUNTER — APPOINTMENT (OUTPATIENT)
Dept: PHYSICAL THERAPY | Facility: CLINIC | Age: 34
End: 2024-08-21
Payer: COMMERCIAL

## 2024-08-21 NOTE — PROGRESS NOTES
Physical Therapy                 Therapy Communication Note    Patient Name: Adrianne Rodríguez  MRN: [id number]  Today's Date: 8/21/2024     Discipline: Physical Therapy    Missed Visit Reason:  via my chart    Missed Time: Cancel

## 2024-08-22 ENCOUNTER — TREATMENT (OUTPATIENT)
Dept: PHYSICAL THERAPY | Facility: CLINIC | Age: 34
End: 2024-08-22
Payer: COMMERCIAL

## 2024-08-22 ENCOUNTER — APPOINTMENT (OUTPATIENT)
Dept: ENDOCRINOLOGY | Facility: CLINIC | Age: 34
End: 2024-08-22
Payer: COMMERCIAL

## 2024-08-22 DIAGNOSIS — M75.21 BICEPS TENDINITIS OF RIGHT SHOULDER: ICD-10-CM

## 2024-08-22 PROCEDURE — 97110 THERAPEUTIC EXERCISES: CPT | Mod: GP | Performed by: PHYSICAL THERAPIST

## 2024-08-22 PROCEDURE — 97112 NEUROMUSCULAR REEDUCATION: CPT | Mod: GP | Performed by: PHYSICAL THERAPIST

## 2024-08-22 NOTE — PROGRESS NOTES
Physical Therapy  Physical Therapy Progress Note    Patient Name Adrianne Rodríguez   MRN: [id number]  Today's Date: 8/22/2024  Time Calculation  Start Time: 0900  Stop Time: 0945  Time Calculation (min): 45 min    Insurance:    Visit #:   12   - [facility]  -authorization required: yes  -number of visits authorized: 24  -Authorization dates: 6-24-24/9-6-24  Therapy Diagnoses:   Problem List Items Addressed This Visit             ICD-10-CM    Biceps tendinitis of right shoulder M75.21     General:  Reason for visit: s/p R shoulder subacromial decompression and  biceps tenodesis, extensive debridement  Referred by: Dr MCKENZIE Charles MD appt:  7/26/24  Preferred Name:  Victoria  Script:  eval and treat, pt brought in the doctor's protocol  Onset Date:  5/23/24  Email/text:  [id number]    Assessment:  Patient tolerated treatment well, did well with progression this date.    Patient needs continued work on/skilled PT for: End ROM stretching and continued work on functional strength and gym program instruction to address remaining functional, objective and subjective deficits to allow them to return to prior /optimal level of function with ADLs.  Patient is progressing with goals: increased ability to sleep on R side and overall improved function with R UE.    Skilled care:  exercise progression    Plan:    Continue to progress per poc:   NV add cable column ER to tolerance and instruct in proper [employer] machine program for UE's    Subjective:   Patient reports:  that the R shoulder is feeling better overall.  She is having some pain with trying to lay on the R side at night, but it is getting better overall.      Have you fallen since last visit:  no    Precautions:   no fall risk  Current Medical management:     PMHx: n/a     Medications for pain: tylenol, meloxicam, oxycodone     Diagnostic Tests: MRI    Pain:  0/10 up to 2-3/10 with laying on the R shoulder and 1-2/10 with reaching overhead/aching/fatigue    HEP  "compliance/understanding:  yes    Objective:   Objective Measurements:    Function:  Patient reports that she is a [employer] and she has done classes and machines in the past and would like to resume.   ROM:    PROM supine  Flex:  0/166  Elevation:  0/180  ER:  0/77    Treatment:   **= HEP  NV= Next visit  np= not performed  nb= non-billable  G= group HEP= discharged to [facility]  Therapeutic Exercise:     30 minutes  Pro II L2 4/4' forward/backward   Pulleys flex/elev:  x 30  /IR x 20 ea.  Cane ER AAROM:  x 20/5\"   Cable Column R shoulder ext/add/IR: Cy[employer]ex in gym:  1 P/ 2 x 10 ea.   Instruct in proper [employer] program over next several visits    Prec-cor Rows:  (all ):  NV  [employer] Lat Pulldown:  NV  [employer] Bicep:  NV  [employer] Tricep:  NV      Manual Therapy:     5 minutes  PROM all planes supine with bolster     Neuromuscular Re-education:    5 minutes  Wall push ups:  x 20  Alt UE on wall:  x 15    Education:  exercise progression  "

## 2024-08-26 ENCOUNTER — APPOINTMENT (OUTPATIENT)
Dept: PHYSICAL THERAPY | Facility: CLINIC | Age: 34
End: 2024-08-26
Payer: COMMERCIAL

## 2024-08-28 ENCOUNTER — TREATMENT (OUTPATIENT)
Dept: PHYSICAL THERAPY | Facility: CLINIC | Age: 34
End: 2024-08-28
Payer: COMMERCIAL

## 2024-08-28 DIAGNOSIS — M75.21 BICEPS TENDINITIS OF RIGHT UPPER EXTREMITY: Primary | ICD-10-CM

## 2024-08-28 DIAGNOSIS — M25.511 PAIN OF RIGHT SHOULDER REGION: ICD-10-CM

## 2024-08-28 DIAGNOSIS — M75.111 NONTRAUMATIC INCOMPLETE TEAR OF RIGHT ROTATOR CUFF: ICD-10-CM

## 2024-08-28 PROCEDURE — 97110 THERAPEUTIC EXERCISES: CPT | Mod: GP

## 2024-08-28 PROCEDURE — 97140 MANUAL THERAPY 1/> REGIONS: CPT | Mod: GP

## 2024-08-28 NOTE — PROGRESS NOTES
Physical Therapy  Physical Therapy Progress Note    Patient Name Adrianne Rodríguez   MRN: [id number]  Today's Date: 8/28/2024  Time Calculation  Start Time: 0835  Stop Time: 0918  Time Calculation (min): 43 min    Insurance:    Visit #:   13   [facility]  -authorization required: yes  -number of visits authorized: 24  -Authorization dates: 6-24-24/9-6-24  Therapy Diagnoses:   Problem List Items Addressed This Visit             ICD-10-CM    Biceps tendinitis - Primary M75.20    Nontraumatic incomplete tear of right rotator cuff M75.111    Pain of right shoulder region M25.511     General:  Reason for visit: s/p R shoulder subacromial decompression and  biceps tenodesis, extensive debridement  Referred by: Dr MCKENZIE Charles MD appt:  7/26/24  Preferred Name:  Victoria  Script:  eval and treat, pt brought in the doctor's protocol  Onset Date:  5/23/24  Email/text:  [id number]  Re-evaluation: 8/28/24    Assessment:  The pt is progressing well towards goals (see objective section), but continues to have limitations with ROM and functional strength, particularly into shoulder flexion and with overhead functional activities.  The pt would benefit from additional PT visits to continue progression in shoulder rehab protocol and to continue progression towards goals for improved tolerance for ADLs and functional activities.   Patient needs continued work on/skilled PT for: End ROM stretching and continued work on functional strength and gym program instruction to address remaining functional, objective and subjective deficits to allow them to return to prior /optimal level of function with ADLs.  Patient is progressing with goals: pain reduction, ROM, flexibility, strength, posture, HEP  Skilled care:  therapeutic exercise, NMR, pt education, manual techniques, re-evaluation    Goals:  STG: Pt (I) with initial HEP; By week 3; Goal MET     LTG Goals: 6/12/24, 8/28/24  By week: 10-12 weeks     Pain: Decrease R shoulder  pain to 1/10 or < with functional activities ; Goal PROGRESSING     Posture: pt to demonstrate postural and body mechanics awareness ; Goal PROGRESSING     ROM: Increase  R shoulder AROM to WNL without limitation from pain. ;  Goal PROGRESSING     Strength: Increase scapular and UE strength to 4+/5 or > grossly for improved tolerance for functional activities. ; Goal PROGRESSING     Flexibility: Improve flexibility of c-spine/UE to WFL  ; Goal PROGRESSING     Palpation: Decrease pain with palpation by 50% or >; Goal PROGRESSING     HEP: Pt to be (I) with HEP  ; Goal PROGRESSING     Outcome Measurement: Quick DASH = 17/11 or < ; Goal PROGRESSING       Plan:    Continue PT 2x/wk for 6-8 weeks to continue progression towards goals.    Continue to progress per poc/ protocol  Continue to focus on progressing shoulder flexion/ ER ROM and RC/ scapular strength    **submit request to insurance for additional visits on 9/5/24**    Subjective:   Patient reports:  that the R shoulder is getting stronger and her ROM is improving, it is just going at a slower pace than she would hope.  She reports fatiguing quickly with overhead activities/ exercises.  She has been compliant with the HEP and is tolerating that well.     Have you fallen since last visit:  no    Precautions:   no fall risk  Current Medical management:     PMHx: n/a     Medications for pain: tylenol, meloxicam, oxycodone     Diagnostic Tests: MRI    Pain:  0/10 up to 2-3/10 with laying on the R shoulder and 1-2/10 with reaching overhead/aching/fatigue    HEP compliance/understanding:  yes    Objective:   Objective Measurements:    Pain:            R Shoulder 6-7/10 ; R shoulder 0-3/10     Posture:           Min forward head, rounded shoulders decreased postural awareness ;   Improved postural awareness     ROM:           AROM           shoulder flexion R n/a , L n/a degrees ; R 132 degrees          abd R n/a , L n/a ; R 138 degrees          extension R n/a , L n/a  "; R 54          IR R n/a , L n/a ; R T-11          ER R n/a , L n/a ; R 80 degrees          PROM           shoulder flexion R  98 degrees ; R 147 degrees          abd 35 ; 120          ER 20 ; 75 degrees     Strength:           Shoulder flexion R/L n/a ; R 3+                          abd R/L n/a ; R 3+                          add R/L n/a ; R 4                          extension R/L n/a ; R 4+                          IR R/L n/a ; R 4+                          ER R/L n/a ; R 4                          biceps R/L n/a ; R 4+                          triceps R/L n/a ; R 4+     Flexibility:           Upper trap R/L min / WNL restriction ; N/a          Pec R/L mon / WNL ; n/a          Levator Scapula R/L n/a / n/a ; n/a        Ortho:  Outcome Measures:  Other Measures  Disability of Arm Shoulder Hand (DASH): 50/11 ; 27/11    Treatment:   **= HEP  NV= Next visit  np= not performed  nb= non-billable  G= group HEP= discharged to HEP  Therapeutic Exercise:    33   minutes  Re-evaluation  Pro II L2 4/4' forward/backward   Pulleys flex/elev:  x 15  /IR x 20 ea.  Cane ER AAROM:  x 10/5\"   Shoulder flexion: 1# 10 x 2  Shoulder scaption: 1# 10 x 2   Supine shoulder flexion: 1# 10 x 1  S/L shoulder abduction: 1# 10 x 1  S/L shoulder ER: 1# 10 x 2  Cable Column R shoulder ext/add/IR: Cybex in gym:  1 P/ 2 x 10 ea. NP  Instruct in proper [employer] program over next several visits    Prec-cor Rows:  (all ):  NV  [employer] Lat Pulldown:  NV  [employer] Bicep:  NV  [employer] Tricep:  NV      Manual Therapy:    10   minutes  PROM all planes supine with bolster     Neuromuscular Re-education:      minutes  Wall push ups:  x 20 NP  Alt UE on wall:  x 15 NP    Education:  Pt ed on progression towards goals and POC  Pt ed on therapeutic technique, posture, HEP  HEP: reviewed  "

## 2024-08-31 DIAGNOSIS — R79.0 LOW IRON STORES: ICD-10-CM

## 2024-08-31 DIAGNOSIS — R79.89 LOW VITAMIN D LEVEL: ICD-10-CM

## 2024-08-31 DIAGNOSIS — Z20.1 EXPOSURE TO TB: Primary | ICD-10-CM

## 2024-09-03 ENCOUNTER — LAB (OUTPATIENT)
Dept: LAB | Facility: LAB | Age: 34
End: 2024-09-03
Payer: COMMERCIAL

## 2024-09-03 ENCOUNTER — TREATMENT (OUTPATIENT)
Dept: PHYSICAL THERAPY | Facility: CLINIC | Age: 34
End: 2024-09-03
Payer: COMMERCIAL

## 2024-09-03 DIAGNOSIS — R79.89 LOW VITAMIN D LEVEL: ICD-10-CM

## 2024-09-03 DIAGNOSIS — Z20.1 EXPOSURE TO TB: ICD-10-CM

## 2024-09-03 DIAGNOSIS — E61.1 IRON DEFICIENCY: ICD-10-CM

## 2024-09-03 DIAGNOSIS — R79.0 LOW IRON STORES: ICD-10-CM

## 2024-09-03 DIAGNOSIS — M75.21 BICEPS TENDINITIS OF RIGHT SHOULDER: ICD-10-CM

## 2024-09-03 LAB
BASOPHILS # BLD AUTO: 0.07 X10*3/UL (ref 0–0.1)
BASOPHILS NFR BLD AUTO: 1.1 %
EOSINOPHIL # BLD AUTO: 0.17 X10*3/UL (ref 0–0.7)
EOSINOPHIL NFR BLD AUTO: 2.7 %
ERYTHROCYTE [DISTWIDTH] IN BLOOD BY AUTOMATED COUNT: 12.1 % (ref 11.5–14.5)
HCT VFR BLD AUTO: 37.4 % (ref 36–46)
HGB BLD-MCNC: 12.8 G/DL (ref 12–16)
IMM GRANULOCYTES # BLD AUTO: 0.01 X10*3/UL (ref 0–0.7)
IMM GRANULOCYTES NFR BLD AUTO: 0.2 % (ref 0–0.9)
LYMPHOCYTES # BLD AUTO: 3.27 X10*3/UL (ref 1.2–4.8)
LYMPHOCYTES NFR BLD AUTO: 51.3 %
MCH RBC QN AUTO: 29.9 PG (ref 26–34)
MCHC RBC AUTO-ENTMCNC: 34.2 G/DL (ref 32–36)
MCV RBC AUTO: 87 FL (ref 80–100)
MONOCYTES # BLD AUTO: 0.58 X10*3/UL (ref 0.1–1)
MONOCYTES NFR BLD AUTO: 9.1 %
NEUTROPHILS # BLD AUTO: 2.28 X10*3/UL (ref 1.2–7.7)
NEUTROPHILS NFR BLD AUTO: 35.6 %
NRBC BLD-RTO: 0 /100 WBCS (ref 0–0)
PLATELET # BLD AUTO: 428 X10*3/UL (ref 150–450)
RBC # BLD AUTO: 4.28 X10*6/UL (ref 4–5.2)
WBC # BLD AUTO: 6.4 X10*3/UL (ref 4.4–11.3)

## 2024-09-03 PROCEDURE — 85025 COMPLETE CBC W/AUTO DIFF WBC: CPT

## 2024-09-03 PROCEDURE — 80053 COMPREHEN METABOLIC PANEL: CPT

## 2024-09-03 PROCEDURE — 97110 THERAPEUTIC EXERCISES: CPT | Mod: GP | Performed by: PHYSICAL THERAPIST

## 2024-09-03 PROCEDURE — 83550 IRON BINDING TEST: CPT

## 2024-09-03 PROCEDURE — 86481 TB AG RESPONSE T-CELL SUSP: CPT

## 2024-09-03 PROCEDURE — 82306 VITAMIN D 25 HYDROXY: CPT

## 2024-09-03 PROCEDURE — 36415 COLL VENOUS BLD VENIPUNCTURE: CPT

## 2024-09-03 PROCEDURE — 83540 ASSAY OF IRON: CPT

## 2024-09-03 PROCEDURE — 82728 ASSAY OF FERRITIN: CPT

## 2024-09-03 NOTE — PROGRESS NOTES
Physical Therapy  Physical Therapy Progress Note    Patient Name Adrianne Rodríguez   MRN: [id number]  Today's Date: 9/3/2024  Time Calculation  Start Time: 0902  Stop Time: 0940  Time Calculation (min): 38 min    Insurance:    Visit #:   14   - [employer], 100%  -authorization required: yes  -number of visits authorized: 24  -Authorization dates: 6-24-24/9-6-24    Therapy Diagnoses:   Problem List Items Addressed This Visit             ICD-10-CM    Biceps tendinitis of right shoulder M75.21     General:  Reason for visit: s/p R shoulder subacromial decompression and  biceps tenodesis, extensive debridement  Referred by: Dr MCKENZIE Charles MD appt:  7/26/24  Preferred Name:  Victoria  Script:  eval and treat, pt brought in the doctor's protocol  Onset Date:  5/23/24  Email/text:  [id number]  Re-evaluation: 8/28/24       Assessment:  Patient tolerated treatment well, did well with progression this date.    Patient needs continued work on/skilled PT for: progression of functional strength, ROM and scapular stability to address remaining functional, objective and subjective deficits to allow them to return to prior /optimal level of function with ADLs.  Patient is progressing with goals: improving AROM and strength  Skilled care:  exercise progression    Plan:    Continue to progress per poc:   NV add progression of gym program to tolerance and submit for more visits to insurance and update re-assessment.      Subjective:   Patient reports:  that she is feeling pretty good.      Have you fallen since last visit:  no    Precautions:   no fall risk  Current Medical management:     PMHx: n/a     Medications for pain: tylenol, meloxicam, oxycodone     Diagnostic Tests: MRI    Pain:  1/10  Location/Type of pain:  aching/stiffness R ant shoulder    HEP compliance/understanding:  yes    Objective:   Objective Measurements:    Function:   plans to resume coming to the gym, swimming, classes and machines  Posture: Increased postural  "awareness    Treatment:   **= HEP  NV= Next visit  np= not performed  nb= non-billable  G= group HEP= discharged to HEP  Therapeutic Exercise:     38 minutes  Pro II L 3 5/5' forward/backward   Pulleys flex/elev:  x 15  /IR x 20 ea.  Cane ER AAROM:  x 10/5\"   Instruct in proper [employer] program over next several visits     Prec-cor Rows:  (all ):  Seat:  6/25#/ x 10 ea.   [employer] Lat Pulldown:  5 Pegs/2 x 10  [employer] Bicep:  4 Pegs/ 2 x 10  Precor Tricep:  Seat:  3/25#/ 2 x 10    Education:  exercise progression  HEP Progression:  issue copy of gym program NV  "

## 2024-09-04 LAB
25(OH)D3 SERPL-MCNC: 35 NG/ML (ref 30–100)
ALBUMIN SERPL BCP-MCNC: 4.4 G/DL (ref 3.4–5)
ALP SERPL-CCNC: 62 U/L (ref 33–110)
ALT SERPL W P-5'-P-CCNC: 15 U/L (ref 7–45)
ANION GAP SERPL CALC-SCNC: 14 MMOL/L (ref 10–20)
AST SERPL W P-5'-P-CCNC: 14 U/L (ref 9–39)
BILIRUB SERPL-MCNC: 0.4 MG/DL (ref 0–1.2)
BUN SERPL-MCNC: 12 MG/DL (ref 6–23)
CALCIUM SERPL-MCNC: 9.6 MG/DL (ref 8.6–10.6)
CHLORIDE SERPL-SCNC: 103 MMOL/L (ref 98–107)
CO2 SERPL-SCNC: 24 MMOL/L (ref 21–32)
CREAT SERPL-MCNC: 0.74 MG/DL (ref 0.5–1.05)
EGFRCR SERPLBLD CKD-EPI 2021: >90 ML/MIN/1.73M*2
FERRITIN SERPL-MCNC: 69 NG/ML (ref 8–150)
GLUCOSE SERPL-MCNC: 94 MG/DL (ref 74–99)
IRON SATN MFR SERPL: 25 % (ref 25–45)
IRON SERPL-MCNC: 88 UG/DL (ref 35–150)
POTASSIUM SERPL-SCNC: 4.2 MMOL/L (ref 3.5–5.3)
PROT SERPL-MCNC: 7.3 G/DL (ref 6.4–8.2)
SODIUM SERPL-SCNC: 137 MMOL/L (ref 136–145)
TIBC SERPL-MCNC: 358 UG/DL (ref 240–445)
UIBC SERPL-MCNC: 270 UG/DL (ref 110–370)

## 2024-09-05 ENCOUNTER — TREATMENT (OUTPATIENT)
Dept: PHYSICAL THERAPY | Facility: CLINIC | Age: 34
End: 2024-09-05
Payer: COMMERCIAL

## 2024-09-05 DIAGNOSIS — M75.21 BICEPS TENDINITIS OF RIGHT UPPER EXTREMITY: ICD-10-CM

## 2024-09-05 DIAGNOSIS — M75.21 BICEPS TENDINITIS OF RIGHT SHOULDER: Primary | ICD-10-CM

## 2024-09-05 DIAGNOSIS — M75.111 NONTRAUMATIC INCOMPLETE TEAR OF RIGHT ROTATOR CUFF: ICD-10-CM

## 2024-09-05 DIAGNOSIS — Z98.890 STATUS POST SUBACROMIAL DECOMPRESSION: ICD-10-CM

## 2024-09-05 DIAGNOSIS — M25.511 PAIN OF RIGHT SHOULDER REGION: ICD-10-CM

## 2024-09-05 PROCEDURE — 97110 THERAPEUTIC EXERCISES: CPT | Mod: GP | Performed by: PHYSICAL THERAPIST

## 2024-09-05 NOTE — PROGRESS NOTES
Physical Therapy  Physical Therapy Progress Note    Patient Name Adrianne Rodríguez   MRN: [id number]  Today's Date: 9/5/2024  Time Calculation  Start Time: 0900  Stop Time: 0940  Time Calculation (min): 40 min    Insurance:    Visit #:   15   [facility]  -authorization required: yes  -number of visits authorized: 24  -Authorization dates: 6-24-24/9-6-24    Therapy Diagnoses:   Problem List Items Addressed This Visit             ICD-10-CM    Biceps tendinitis M75.20    Nontraumatic incomplete tear of right rotator cuff M75.111    Pain of right shoulder region M25.511    Biceps tendinitis of right shoulder - Primary M75.21    Status post subacromial decompression Z98.890     General:  Reason for visit: s/p R shoulder subacromial decompression and  biceps tenodesis, extensive debridement  Referred by: Dr MCKENZIE Charles MD appt:  7/26/24  Preferred Name:  Victoria  Script:  eval and treat, pt brought in the doctor's protocol  Onset Date:  5/23/24  Email/text:  [id number]  Re-evaluation: 8/28/24    Assessment:  Patient tolerated treatment well, did well with progression this date.    Patient needs continued work on/skilled PT for: End ROM/scapular stabilization and functional strength to address remaining functional, objective and subjective deficits to allow them to return to prior /optimal level of function with ADLs.  Patient is progressing with goals: see below  Skilled care:  exercise progression and partial re-assessment this date    From 8-28-24 re-assessment    STG: Pt (I) with initial HEP; By week 3; Goal MET     LTG Goals: 6/12/24, 8/28/24  By week: 10-12 weeks     Pain: Decrease R shoulder pain to 1/10 or < with functional activities ; Goal PROGRESSING     Posture: pt to demonstrate postural and body mechanics awareness ; Goal PROGRESSING     ROM: Increase  R shoulder AROM to WNL without limitation from pain. ;  Goal PROGRESSING     Strength: Increase scapular and UE strength to 4+/5 or > grossly for  improved tolerance for functional activities. ; Goal PROGRESSING     Flexibility: Improve flexibility of c-spine/UE to WFL  ; Goal PROGRESSING     Palpation: Decrease pain with palpation by 50% or >; Goal PROGRESSING     HEP: Pt to be (I) with HEP  ; Goal PROGRESSING     Outcome Measurement: Quick DASH = 17/11 or < ; Goal PROGRESSING    Plan:    Continue to progress per poc:   NV add progression of free weight activities  Continue PT 2x/wk for 6-8 weeks to continue progression towards goals.     Continue to progress per poc/ protocol  Continue to focus on progressing shoulder flexion/ ER ROM and RC/ scapular strength    Subjective:   Patient reports:  that she felt good after doing machines in the gym last session.      Have you fallen since last visit:  no    Precautions:  no fall risk  Current Medical management:     PMHx: n/a     Medications for pain: tylenol, meloxicam, oxycodone     Diagnostic Tests: MRI    Pain:  1/10  Location/Type of pain:  R shoulder soreness    HEP compliance/understanding:  yes    Objective:   Objective Measurements:   From 8-28-24 re-assessment with ROM measurements updated today.    Pain:            R Shoulder 6-7/10 ; R shoulder 0-3/10     Posture:           Min forward head, rounded shoulders decreased postural awareness ;   Improved postural awareness     ROM:           AROM           shoulder flexion R n/a , L n/a degrees ; R 0/148(was 132) degrees  R shoulder elevation:  0/154  Apley Scratch:  IR:  L1  ER:  C6          abd R n/a , L n/a ; R 138 degrees          extension R n/a , L n/a ; R 54          IR R n/a , L n/a ; R T-11          ER R n/a , L n/a ; R 80 degrees          PROM           shoulder flexion R  98 degrees ; R 0/166(was 147) degrees          abd 35 ; 0/180(was 120)          ER 20 ; 90(was 75) degrees     Strength:           Shoulder flexion R/L n/a ; R 3+                          abd R/L n/a ; R 3+                          add R/L n/a ; R 4                           "extension R/L n/a ; R 4+                          IR R/L n/a ; R 4+                          ER R/L n/a ; R 4                          biceps R/L n/a ; R 4+                          triceps R/L n/a ; R 4+     Flexibility:           Upper trap R/L min / WNL restriction ; N/a          Pec R/L mon / WNL ; n/a          Levator Scapula R/L n/a / n/a ; n/a        Ortho:  Outcome Measures:  Other Measures  Disability of Arm Shoulder Hand (DASH): 50/11 ; 27/11    Treatment:   **= HEP  NV= Next visit  np= not performed  nb= non-billable  G= group HEP= discharged to [facility]  Therapeutic Exercise:     35 minutes  Pro II L3 5/5' forward/backward   Pulleys flex/elev:  x 30 ea.   /IR x 30 ea.  Cane ER AAROM:  x 30/5\"   Bicep curls:  5#/ 2 x 10  R Bent over Rows:  5#/ 2 x 10    Manual Therapy:       minutes  PROM for ROM measurements    Neuromuscular Re-education:    3 minutes  Wall push ups:  x 30  Alt UE on wall:  x 30    Education:  exercise progression/poc    "

## 2024-09-06 LAB
NIL(NEG) CONTROL SPOT COUNT: NORMAL
PANEL A SPOT COUNT: 0
PANEL B SPOT COUNT: 0
POS CONTROL SPOT COUNT: NORMAL
T-SPOT. TB INTERPRETATION: NEGATIVE

## 2024-09-10 ENCOUNTER — APPOINTMENT (OUTPATIENT)
Dept: ENDOCRINOLOGY | Facility: CLINIC | Age: 34
End: 2024-09-10
Payer: COMMERCIAL

## 2024-09-10 ENCOUNTER — APPOINTMENT (OUTPATIENT)
Dept: PHYSICAL THERAPY | Facility: CLINIC | Age: 34
End: 2024-09-10
Payer: COMMERCIAL

## 2024-09-10 VITALS
TEMPERATURE: 97.2 F | BODY MASS INDEX: 39.15 KG/M2 | RESPIRATION RATE: 17 BRPM | HEART RATE: 77 BPM | HEIGHT: 65 IN | DIASTOLIC BLOOD PRESSURE: 73 MMHG | SYSTOLIC BLOOD PRESSURE: 105 MMHG | WEIGHT: 235 LBS

## 2024-09-10 PROCEDURE — 1036F TOBACCO NON-USER: CPT | Performed by: STUDENT IN AN ORGANIZED HEALTH CARE EDUCATION/TRAINING PROGRAM

## 2024-09-10 PROCEDURE — 3008F BODY MASS INDEX DOCD: CPT | Performed by: STUDENT IN AN ORGANIZED HEALTH CARE EDUCATION/TRAINING PROGRAM

## 2024-09-10 PROCEDURE — 99214 OFFICE O/P EST MOD 30 MIN: CPT | Performed by: STUDENT IN AN ORGANIZED HEALTH CARE EDUCATION/TRAINING PROGRAM

## 2024-09-10 RX ORDER — DEXAMETHASONE 1 MG/1
1 TABLET ORAL ONCE
Qty: 1 TABLET | Refills: 0 | Status: SHIPPED | OUTPATIENT
Start: 2024-09-10 | End: 2024-09-10

## 2024-09-10 NOTE — PROGRESS NOTES
Endocrinology  9/10/2024    History of Present Illness   Adrianne Rodríguez is a 34 y.o. year old female with medical history of obesity class II, vitamin D deficiency, iron deficiency, here for rule out hypercortisolism.     She has seen Dr. Xie for weight management.     Her primary care NP mentioned that she should have her cortisol levels checked.  She reached out to Dr. Xie whom she is following for weight mgmt.     Weight history:   - Gained weight around age 21. Difficulty losing weight.   - Has seen Elaine Reid RD   - Planning to join weight mgmt. [facility]   - She was started on Qsymia. Developed heart palpitations on this medication and stopped it.   - Tried phentermine years ago. She lost weight with it. Stopped it when she became pregnant in 2020.   - Has had three pregnancies - harder to lose weight after the third one     No history of diabetes   No HTN   No fractures   +easy bruising   Denies striae   No muscle weakness   She has regular periods   She has had 3 pregnancies   Not on any OCP. She is s/p tubal ligation.     Review of Systems   General: Denies fever or chills   Head: Denies headache or vision changes   Neck: Denies tenderness or lumps   Cardiac: Denies chest pain   Respiratory: Denies shortness of breath or cough   GI: Denies abdominal pain, nausea, or vomiting   Extremities: Denies swelling   Skin: Denies rash     Medications     Current Outpatient Medications   Medication Instructions    acetaminophen (TYLENOL) 1,000 mg, oral, Every 8 hours PRN    calcium carbonate-vitamin D3 500 mg-5 mcg (200 unit) tablet 1 tablet, oral, Daily    dicyclomine (BENTYL) 10 mg, oral    ferrous sulfate 300 mg (60 mg iron)/5 mL syrup 60 mg of iron, oral, Daily    fluticasone (Flonase) 50 mcg/actuation nasal spray 2 sprays, Each Nostril, Daily    loratadine (CLARITIN) 10 mg, oral, Daily    omeprazole OTC (PRILOSEC OTC) 20 mg, oral, Daily before breakfast, Do not crush, chew, or split.    ondansetron  Fide ODT (Zofran-ODT) 4 mg disintegrating tablet 1 tablet, oral, Every 8 hours PRN          History     Past Medical History:   Diagnosis Date    14 weeks gestation of pregnancy ([facility]) 06/02/2021    14 weeks gestation of pregnancy    16 weeks gestation of pregnancy ([facility]) 06/16/2021    16 weeks gestation of pregnancy    20 weeks gestation of pregnancy ([facility]) 07/14/2021    20 weeks gestation of pregnancy    24 weeks gestation of pregnancy ([facility]) 08/30/2021    24 weeks gestation of pregnancy    28 weeks gestation of pregnancy ([facility]) 09/09/2021    28 weeks gestation of pregnancy    33 weeks gestation of pregnancy ([facility]) 10/13/2021    33 weeks gestation of pregnancy    35 weeks gestation of pregnancy ([facility]) 10/28/2021    35 weeks gestation of pregnancy    Abnormal findings on diagnostic imaging of other specified body structures 04/17/2020    Abnormal CAT scan    Abnormal level of blood mineral 08/25/2020    Low iron stores    Acute bronchospasm 09/26/2019    Acute bronchospasm    Biceps tendinitis of right shoulder     Body mass index (BMI)40.0-44.9, adult 02/10/2021    Body mass index (BMI) of 40.0 to 44.9 in adult    Encounter for immunization 10/13/2021    Need for Tdap vaccination    Encounter for pregnancy test, result positive ([facility]) 03/27/2021    Positive urine pregnancy test    Encounter for routine postpartum follow-up ([facility]) 12/15/2021    Encounter for postpartum visit    Encounter for supervision of other normal pregnancy, third trimester ([facility]) 10/28/2021    Encounter for supervision of normal pregnancy in multigravida in third trimester    Impingement syndrome of right shoulder     Inappropriate diet and eating habits 08/13/2020    Inappropriate diet and eating habits    Labral tear of shoulder, right, initial encounter     Major depressive disorder, recurrent, unspecified (CMS-HCC) 01/15/2019    Major depressive disorder, recurrent episode with anxious distress    Mastodynia  2020    Pain of left breast    Nonpurulent mastitis associated with the puerperium ([facility]) 2020    Mastitis, postpartum    Obesity, unspecified 2019    Obesity (BMI 30-39.9)    Other allergy status, other than to drugs and biological substances 2020    History of environmental allergies    Other conditions influencing health status 2021    History of pregnancy    Other malaise 2020    Malaise and fatigue    Other specified abnormal findings of blood chemistry 2020    Abnormal TSH    Pain in right knee     Bilateral knee pain    Pain in right knee 2021    Knee pain, right    Pain in unspecified hip 2019    Hip pain    Papillomavirus as the cause of diseases classified elsewhere     HPV in female    Personal history of other complications of pregnancy, childbirth and the puerperium 2021    History of threatened     Personal history of other diseases of the female genital tract     History of abnormal cervical Papanicolaou smear    Personal history of other drug therapy 10/13/2021    History of influenza vaccination    Personal history of other specified conditions 2020    History of syncope    Personal history of other specified conditions 02/10/2021    History of flank pain    Personal history of other specified conditions 2022    History of vertigo    Personal history of other specified conditions 2019    History of palpitations    RLS (restless legs syndrome)     Scotoma involving central area, unspecified eye 2020    Visual field scotoma    Secondary amenorrhea 2021    Amenorrhea, secondary    Unspecified condition associated with female genital organs and menstrual cycle 2022    Vaginal discomfort    Unspecified visual disturbance 2020    Vision disturbance    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    Vertigo     Vomiting of pregnancy, unspecified ([facility]) 2021     "Pregnancy, excessive vomiting       Past Surgical History:   Procedure Laterality Date    OTHER SURGICAL HISTORY  2020     section    OTHER SURGICAL HISTORY  2021    Colposcopy    OTHER SURGICAL HISTORY  2021    Breast surgery    OTHER SURGICAL HISTORY  2021    Cervical loop electrosurgical excision    OTHER SURGICAL HISTORY  2022    Tubal ligation       Family History   Problem Relation Name Age of Onset    Amblyopia Mother      Depression Mother      Other (Substance Use Disorder) Mother      Other (Substance Use Disorder) Brother      Breast cancer Mother's Sister      Brain cancer Mother's Sister      Bipolar disorder Father's Sister      Breast cancer Paternal Grandmother          No Known Allergies       Physical Exam   /73 (BP Location: Left arm, Patient Position: Sitting)   Pulse 77   Temp 36.2 °C (97.2 °F)   Resp 17   Ht 1.651 m (5' 5\")   Wt 107 kg (235 lb)   LMP 09/10/2024 (Exact Date)   BMI 39.11 kg/m²   General: Well appearing, no acute distress  Heart: Normal rate  Neck: No visible goiter   Lungs: Breathing comfortably on room air   Skin: No rashes      Labs and Imaging     Component      Latest Ref Rng 2024   Hemoglobin A1C      see below % 4.9    Estimated Average Glucose      Not Established mg/dL 94    Thyroid Stimulating Hormone      0.44 - 3.98 mIU/L 0.65          Assessment and Plan   Adrianne Rodríguez is a 34 y.o. year old female with medical history of obesity class II, vitamin D deficiency, iron deficiency, here for rule out hypercortisolism. Low suspicion for hypercortisolism.     # Obesity class II:   - 1 mg DST     RTC pending results     Isa Ulloa DO   Endocrinology                       "

## 2024-09-10 NOTE — PATIENT INSTRUCTIONS
Take 1 mg dexamethasone around 11:00 pm   The following morning, get fasting labs drawn at 8:00 am

## 2024-09-16 ENCOUNTER — APPOINTMENT (OUTPATIENT)
Dept: ENDOCRINOLOGY | Facility: CLINIC | Age: 34
End: 2024-09-16
Payer: COMMERCIAL

## 2024-09-20 ENCOUNTER — APPOINTMENT (OUTPATIENT)
Dept: PHYSICAL THERAPY | Facility: CLINIC | Age: 34
End: 2024-09-20
Payer: COMMERCIAL

## 2024-10-04 ENCOUNTER — APPOINTMENT (OUTPATIENT)
Dept: PHYSICAL THERAPY | Facility: CLINIC | Age: 34
End: 2024-10-04
Payer: COMMERCIAL

## 2024-10-10 ENCOUNTER — APPOINTMENT (OUTPATIENT)
Dept: ENDOCRINOLOGY | Facility: CLINIC | Age: 34
End: 2024-10-10
Payer: COMMERCIAL

## 2024-10-11 ENCOUNTER — APPOINTMENT (OUTPATIENT)
Dept: PHYSICAL THERAPY | Facility: CLINIC | Age: 34
End: 2024-10-11
Payer: COMMERCIAL

## 2024-10-17 ENCOUNTER — DOCUMENTATION (OUTPATIENT)
Dept: PHYSICAL THERAPY | Facility: CLINIC | Age: 34
End: 2024-10-17
Payer: COMMERCIAL

## 2024-10-17 NOTE — PROGRESS NOTES
Physical Therapy                 Therapy Communication Note    Patient Name: Adrianne Rodríguez  MRN: [id number]  Department:   Room: Room/bed info not found  Today's Date: 10/17/2024     Discipline: Physical Therapy    Missed Visit Reason:  called patient re: NS and she forgot about appointment    Missed Time: No Show    Comment:  Patient plans to attend appointment Tuesday at 9:00

## 2024-10-20 ENCOUNTER — APPOINTMENT (OUTPATIENT)
Dept: URGENT CARE | Age: 34
End: 2024-10-20
Payer: COMMERCIAL

## 2024-10-22 ENCOUNTER — DOCUMENTATION (OUTPATIENT)
Dept: PHYSICAL THERAPY | Facility: CLINIC | Age: 34
End: 2024-10-22
Payer: COMMERCIAL

## 2024-10-22 NOTE — PROGRESS NOTES
Physical Therapy                 Therapy Communication Note    Patient Name: Adrianne Rodríguez  MRN: [id number]  Department:   Room: Room/bed info not found  Today's Date: 10/22/2024     Discipline: Physical Therapy    Missed Visit Reason:  called patient re: NS    Missed Time: No Show    Comment: given reminder of next appointment time.

## 2024-10-24 ENCOUNTER — APPOINTMENT (OUTPATIENT)
Dept: PHYSICAL THERAPY | Facility: CLINIC | Age: 34
End: 2024-10-24
Payer: COMMERCIAL

## 2024-11-12 ENCOUNTER — APPOINTMENT (OUTPATIENT)
Dept: DERMATOLOGY | Facility: CLINIC | Age: 34
End: 2024-11-12
Payer: COMMERCIAL

## 2024-11-13 ENCOUNTER — DOCUMENTATION (OUTPATIENT)
Dept: PHYSICAL THERAPY | Facility: CLINIC | Age: 34
End: 2024-11-13
Payer: COMMERCIAL

## 2024-11-13 NOTE — PROGRESS NOTES
Physical Therapy    Discharge Summary    Name: Adrianne Rodríguez  MRN: [id number]  : 1990  Date: 24    Discharge Summary: PT    Discharge Information: Date of discharge 24    Therapy Summary: Discharged due to lapse of dates covered.    Discharge Status: Discharged for PT from 24 to 10/22/24    Rehab Discharge Reason: Failed to schedule and/or keep follow-up appointment(s)

## 2024-12-17 ENCOUNTER — TELEPHONE (OUTPATIENT)
Dept: PRIMARY CARE | Facility: CLINIC | Age: 34
End: 2024-12-17
Payer: COMMERCIAL

## 2024-12-17 DIAGNOSIS — R42 VERTIGO: Primary | ICD-10-CM

## 2024-12-17 RX ORDER — MECLIZINE HYDROCHLORIDE 25 MG/1
25 TABLET ORAL 3 TIMES DAILY PRN
Qty: 20 TABLET | Refills: 0 | Status: SHIPPED | OUTPATIENT
Start: 2024-12-17

## 2025-01-15 NOTE — PROGRESS NOTES
"AUDIOLOGY ADULT AUDIOMETRIC EVALUATION      Name:  Adrianne Rodríguez  :  1990  Age:  34 y.o.  Date of Evaluation:  2025    HISTORY  Reason for visit:   dizziness   Ms. Rodríguez is seen 1/15/2025 at the request of Emanuel Chow M.D.  for an evaluation of hearing.      Chief complaint:     Dizziness; vertigo  - feels like she is spinning most of the time for 2 months; associates it with facial pressure  - history of vertigo, since her early 20s    Hearing loss:  fine; left a little \"clogged\"  Tinnitus:   bilateral, for about 2 months, intermittent; with pain   Otitis Media: denies  Otologic surgical history:  denies  Dizziness/imbalance:  yes  Otalgia:  bilateral; worse with sharp tinnitus; 4/10 today  Ear pressure/fullness:  left more than right  History of excessive noise exposure:  yes  Other: none         EVALUATION  Please find audiogram in \"Media\" tab (Document Type:  Audiology Report) or included at the bottom of this note.    RESULTS   Otoscopic Evaluation: ***     Immittance Measures (226 Hz probe tone):   ***    ***    Test technique:  standard behavioral technique via ***.  Reliability is ***.    Pure Tone Audiometry:  ***    Speech Audiometry:        Right Ear:  Speech Reception Threshold (SRT) was obtained at *** dBHL                 Speech discrimination score was ***% in quiet when words were presented at *** dBHL      Left Ear:  Speech Reception Threshold (SRT) was obtained at *** dBHL                 Speech discrimination score was ***% in quiet when words were presented at *** dBHL    IMPRESSIONS:  ***    RECOMMENDATIONS  ***    PATIENT EDUCATION  Discussed results and recommendations with ***.  Questions were addressed and the patient was encouraged to contact our department should concerns arise.       MARGARETH Oates, [facility]  Licensed Audiologist    ***   " bilaterally.      Hearing sensitivity is essentially within normal limits for 250-8000 Hz bilaterally.     RECOMMENDATIONS  Continue with ENT follow-up with Emanuel Chow M.D.   Reassess hearing in 1 year (or sooner if medically indicated or if there is a concern for a change in hearing).    Continue with medical follow-up as indicated.       PATIENT EDUCATION  Discussed results and recommendations with patient.  Questions were addressed and the patient was encouraged to contact our department should concerns arise.       MARGARETH Oates, [facility]  Licensed Audiologist

## 2025-01-17 ENCOUNTER — APPOINTMENT (OUTPATIENT)
Dept: PRIMARY CARE | Facility: CLINIC | Age: 35
End: 2025-01-17
Payer: COMMERCIAL

## 2025-01-22 ENCOUNTER — OFFICE VISIT (OUTPATIENT)
Dept: OBSTETRICS AND GYNECOLOGY | Facility: CLINIC | Age: 35
End: 2025-01-22
Payer: COMMERCIAL

## 2025-01-22 VITALS — WEIGHT: 229 LBS | HEIGHT: 65 IN | BODY MASS INDEX: 38.15 KG/M2

## 2025-01-22 DIAGNOSIS — N76.0 ACUTE VAGINITIS: ICD-10-CM

## 2025-01-22 DIAGNOSIS — R35.0 URINARY FREQUENCY: ICD-10-CM

## 2025-01-22 LAB
POC APPEARANCE, URINE: ABNORMAL
POC BILIRUBIN, URINE: NEGATIVE
POC BLOOD, URINE: NEGATIVE
POC COLOR, URINE: YELLOW
POC GLUCOSE, URINE: NEGATIVE MG/DL
POC KETONES, URINE: NEGATIVE MG/DL
POC LEUKOCYTES, URINE: NEGATIVE
POC NITRITE,URINE: NEGATIVE
POC PH, URINE: 5 PH
POC PROTEIN, URINE: ABNORMAL MG/DL
POC SPECIFIC GRAVITY, URINE: 1.02
POC UROBILINOGEN, URINE: 0.2 EU/DL

## 2025-01-22 PROCEDURE — 3008F BODY MASS INDEX DOCD: CPT | Performed by: OBSTETRICS & GYNECOLOGY

## 2025-01-22 PROCEDURE — 87086 URINE CULTURE/COLONY COUNT: CPT

## 2025-01-22 PROCEDURE — 1036F TOBACCO NON-USER: CPT | Performed by: OBSTETRICS & GYNECOLOGY

## 2025-01-22 PROCEDURE — 81003 URINALYSIS AUTO W/O SCOPE: CPT | Performed by: OBSTETRICS & GYNECOLOGY

## 2025-01-22 PROCEDURE — 99213 OFFICE O/P EST LOW 20 MIN: CPT | Performed by: OBSTETRICS & GYNECOLOGY

## 2025-01-22 PROCEDURE — 87205 SMEAR GRAM STAIN: CPT

## 2025-01-22 RX ORDER — FLUCONAZOLE 150 MG/1
150 TABLET ORAL ONCE
Qty: 1 TABLET | Refills: 0 | Status: SHIPPED | OUTPATIENT
Start: 2025-01-22 | End: 2025-01-22

## 2025-01-22 ASSESSMENT — PAIN SCALES - GENERAL: PAINLEVEL_OUTOF10: 0-NO PAIN

## 2025-01-22 NOTE — PROGRESS NOTES
Subjective   Patient ID: Adrianne Rodríguez is a 34 y.o. female who presents for Vaginal /Bladder Problem (C/o  a lot of discharge/- urinary frequency/UA today in office 1+Protein, neg x9//Chaperone declined).  HPI  As contained in the chief complaint  Review of Systems  10 systems have been reviewed and are negative and noncontributory to the patient current ailments.    Objective   Physical Exam  Vital signs reviewed      ABDOMEN- soft, non distended, bowel sound normal pitch and intensity,no palpable abnormal masses  GENITOURINARY- External genitalia: Normal.                                 - Uterus: AV/AF NSSC, mobile and nontender                                -The adnexal areas are free of tenderness or mass                                -There are no cervical lesions; there is no cervical motion tenderness                                -Vagina without lesions, mucosa pink and well-hydrated, discharge is yeast like in appearance                           Assessment/Plan   Diagnoses and all orders for this visit:  Acute vaginitis  -     Vaginitis Gram Stain For Bacterial Vaginosis + Yeast  Urinary frequency  -     POCT UA Automated manually resulted  -     Urine Culture  -         Redd Jones DO 01/22/25 10:19 AM

## 2025-01-23 LAB
BACTERIA UR CULT: NORMAL
CLUE CELLS VAG LPF-#/AREA: NORMAL /[LPF]
NUGENT SCORE: 3
YEAST VAG WET PREP-#/AREA: NORMAL

## 2025-01-28 ENCOUNTER — APPOINTMENT (OUTPATIENT)
Dept: PRIMARY CARE | Facility: CLINIC | Age: 35
End: 2025-01-28
Payer: COMMERCIAL

## 2025-01-29 ENCOUNTER — OFFICE VISIT (OUTPATIENT)
Dept: OTOLARYNGOLOGY | Facility: CLINIC | Age: 35
End: 2025-01-29
Payer: COMMERCIAL

## 2025-01-29 ENCOUNTER — CLINICAL SUPPORT (OUTPATIENT)
Dept: AUDIOLOGY | Facility: CLINIC | Age: 35
End: 2025-01-29
Payer: COMMERCIAL

## 2025-01-29 VITALS
BODY MASS INDEX: 37.32 KG/M2 | HEART RATE: 73 BPM | RESPIRATION RATE: 18 BRPM | SYSTOLIC BLOOD PRESSURE: 100 MMHG | OXYGEN SATURATION: 99 % | TEMPERATURE: 98.3 F | WEIGHT: 224 LBS | DIASTOLIC BLOOD PRESSURE: 66 MMHG | HEIGHT: 65 IN

## 2025-01-29 DIAGNOSIS — J34.2 NASAL SEPTAL DEVIATION: ICD-10-CM

## 2025-01-29 DIAGNOSIS — H81.11 BENIGN PAROXYSMAL POSITIONAL VERTIGO OF RIGHT EAR: Primary | ICD-10-CM

## 2025-01-29 DIAGNOSIS — H93.13 TINNITUS, BILATERAL: ICD-10-CM

## 2025-01-29 DIAGNOSIS — R42 DIZZINESS AND GIDDINESS: Primary | ICD-10-CM

## 2025-01-29 DIAGNOSIS — H81.399 PERIPHERAL VERTIGO, UNSPECIFIED LATERALITY: ICD-10-CM

## 2025-01-29 DIAGNOSIS — J31.0 CHRONIC RHINITIS: ICD-10-CM

## 2025-01-29 DIAGNOSIS — J34.89 NASAL DRAINAGE: ICD-10-CM

## 2025-01-29 PROCEDURE — 3008F BODY MASS INDEX DOCD: CPT | Performed by: STUDENT IN AN ORGANIZED HEALTH CARE EDUCATION/TRAINING PROGRAM

## 2025-01-29 PROCEDURE — 1036F TOBACCO NON-USER: CPT | Performed by: STUDENT IN AN ORGANIZED HEALTH CARE EDUCATION/TRAINING PROGRAM

## 2025-01-29 PROCEDURE — 99214 OFFICE O/P EST MOD 30 MIN: CPT | Performed by: STUDENT IN AN ORGANIZED HEALTH CARE EDUCATION/TRAINING PROGRAM

## 2025-01-29 PROCEDURE — 92550 TYMPANOMETRY & REFLEX THRESH: CPT | Mod: 52 | Performed by: AUDIOLOGIST

## 2025-01-29 PROCEDURE — 92557 COMPREHENSIVE HEARING TEST: CPT | Performed by: AUDIOLOGIST

## 2025-01-29 PROCEDURE — 99214 OFFICE O/P EST MOD 30 MIN: CPT | Mod: GC | Performed by: STUDENT IN AN ORGANIZED HEALTH CARE EDUCATION/TRAINING PROGRAM

## 2025-01-29 RX ORDER — OMEPRAZOLE 20 MG/1
20 CAPSULE, DELAYED RELEASE ORAL AS NEEDED
COMMUNITY

## 2025-01-29 RX ORDER — TRIAMCINOLONE ACETONIDE 55 UG/1
2 SPRAY, METERED NASAL DAILY
Qty: 16.5 G | Refills: 11 | Status: SHIPPED | OUTPATIENT
Start: 2025-01-29 | End: 2026-01-29

## 2025-01-29 SDOH — ECONOMIC STABILITY: FOOD INSECURITY: WITHIN THE PAST 12 MONTHS, THE FOOD YOU BOUGHT JUST DIDN'T LAST AND YOU DIDN'T HAVE MONEY TO GET MORE.: NEVER TRUE

## 2025-01-29 SDOH — ECONOMIC STABILITY: FOOD INSECURITY: WITHIN THE PAST 12 MONTHS, YOU WORRIED THAT YOUR FOOD WOULD RUN OUT BEFORE YOU GOT MONEY TO BUY MORE.: NEVER TRUE

## 2025-01-29 ASSESSMENT — LIFESTYLE VARIABLES
HOW MANY STANDARD DRINKS CONTAINING ALCOHOL DO YOU HAVE ON A TYPICAL DAY: 1 OR 2
HOW OFTEN DO YOU HAVE A DRINK CONTAINING ALCOHOL: MONTHLY OR LESS
SKIP TO QUESTIONS 9-10: 1
AUDIT-C TOTAL SCORE: 1
HOW OFTEN DO YOU HAVE SIX OR MORE DRINKS ON ONE OCCASION: NEVER

## 2025-01-29 ASSESSMENT — ENCOUNTER SYMPTOMS
LOSS OF SENSATION IN FEET: 0
OCCASIONAL FEELINGS OF UNSTEADINESS: 1
DEPRESSION: 0

## 2025-01-29 ASSESSMENT — PAIN SCALES - GENERAL
PAINLEVEL_OUTOF10: 4
PAINLEVEL_OUTOF10: 0-NO PAIN

## 2025-01-29 ASSESSMENT — COLUMBIA-SUICIDE SEVERITY RATING SCALE - C-SSRS
1. IN THE PAST MONTH, HAVE YOU WISHED YOU WERE DEAD OR WISHED YOU COULD GO TO SLEEP AND NOT WAKE UP?: NO
2. HAVE YOU ACTUALLY HAD ANY THOUGHTS OF KILLING YOURSELF?: NO
6. HAVE YOU EVER DONE ANYTHING, STARTED TO DO ANYTHING, OR PREPARED TO DO ANYTHING TO END YOUR LIFE?: NO

## 2025-01-29 ASSESSMENT — PAIN - FUNCTIONAL ASSESSMENT: PAIN_FUNCTIONAL_ASSESSMENT: 0-10

## 2025-01-29 NOTE — PROGRESS NOTES
SUBJECTIVE  Patient ID: Adrianne Rodríguez is a 34 y.o. female who presents for Dizziness (Adrianne Rodríguez is here for Vertigo experiencing the last 2 months . Hearing test completed today. ).    Patient last seen by me in 2023 with phantosmia and altered sense of smell (no issues today from her nose).  Here today with new concern.    The patient reports that two months ago she had a two week spell of acute vertigo - room spinning sensation - that resulted in her unable to perform her activities of daily living. Since then, she has had this same sensation intermittently - in particular, with rolling over in bed and quick head movements. She denies a history of prior ear surgery, noise exposure, exposure to ototoxic drugs or agents, and family history of hearing loss. Denies prior illness during this time or head trauma.     She has appreciated persistent postnasal drainage that has not been relieved with fluticasone nasal spray on a regular basis. Notes some difficulty with thicker, dry mucous.    OBJECTIVE  Physical Exam  CONSTITUTIONAL: Well appearing female  who appears stated age.  PSYCHIATRIC: Alert, appropriate mood and affect.  RESPIRATORY: Normal inspiration and expiration and chest wall expansion; no use of accessory muscles to breathe.  VOICE: Clear speech without hoarseness. No stridor nor stertor.  HEAD AND FACE: Symmetric facial features. No cutaneous masses or lesions were visualized.  RIGHT EAR:  Normal external ear and post auricular area, no visible lesions, external auditory canal patent, tympanic membrane intact, no retraction, no signs of mass, effusion, or infection within the middle ear.  LEFT EAR: Normal external ear and post auricular area, no visible lesions, external auditory canal patent, tympanic membrane intact, no retraction, no signs of mass, effusion, or infection within the middle ear.   EYES: Pupils were equal in size and reactive to light. Extra-ocular muscle function was  intact. No nystagmus was observed. Vision was grossly intact.  NEUROLOGIC: Cranial nerves III, IV, and VI were noted to be intact via extra-ocular muscle movement testing. Cranial nerve VII was noted to be intact and symmetric by facial movement. Cranial nerve VIII was tested with normal voice examination and revealed grossly normal hearing.  - Finger-to-nose: normal  - Heel-to-shin: normal  - Romberg: normal  - Fukuda step test: normal  - Head impulse testing: (+) from R to L  - [place]-Hallpike: (+) when R facing  NOSE: On anterior rhinoscopy there is left nasal septal deviation. No drainage.    --------------------------------------------------  Audiology:  1/29/2025.  I personally reviewed the patient's audiogram from today.  This demonstrated normal hearing with excellent word recognition scores, type A tympanograms, and preserved acoustic reflexes bilaterally.   --------------------------------------------------    ASSESSMENT/PLAN  Diagnoses and all orders for this visit:  Benign paroxysmal positional vertigo of right ear  -     Referral to Physical Therapy; Future  -     triamcinolone (Nasacort) 55 mcg nasal inhaler; Administer 2 sprays into each nostril once daily.  Peripheral vertigo, unspecified laterality  Chronic rhinitis  Nasal drainage  Nasal septal deviation      34 y.o. female with several month history of vertigo.     Vertigo - Her initial history is concerning for an acute vestibular neuritis episode followed by BPPV, which has persisted. She is interested in vestibular therapy to help with otolith repositioning and balance retraining. Order placed.  Nasal drainage- Has tried flonase in the past without major improvement, will try Nasocort today until follow up. Consider addition of antihistamine spray versus repeat endoscopy.    Follow up in 2-3 months for repeat evaluation    This note was created using speech recognition transcription software. Despite proofreading, typographical errors may be  present that affect the meaning of the content. Please contact my office with any questions.

## 2025-02-05 ENCOUNTER — OFFICE VISIT (OUTPATIENT)
Dept: PRIMARY CARE | Facility: CLINIC | Age: 35
End: 2025-02-05
Payer: COMMERCIAL

## 2025-02-05 ENCOUNTER — DOCUMENTATION (OUTPATIENT)
Dept: ENDOCRINOLOGY | Facility: CLINIC | Age: 35
End: 2025-02-05

## 2025-02-05 ENCOUNTER — APPOINTMENT (OUTPATIENT)
Dept: ENDOCRINOLOGY | Facility: CLINIC | Age: 35
End: 2025-02-05
Payer: COMMERCIAL

## 2025-02-05 VITALS
DIASTOLIC BLOOD PRESSURE: 82 MMHG | WEIGHT: 225 LBS | BODY MASS INDEX: 37.49 KG/M2 | SYSTOLIC BLOOD PRESSURE: 120 MMHG | TEMPERATURE: 97.4 F | HEIGHT: 65 IN

## 2025-02-05 DIAGNOSIS — Z13.6 SCREENING FOR HEART DISEASE: Primary | ICD-10-CM

## 2025-02-05 DIAGNOSIS — Z13.6 SCREENING FOR HEART DISEASE: ICD-10-CM

## 2025-02-05 DIAGNOSIS — Z13.21 ENCOUNTER FOR VITAMIN DEFICIENCY SCREENING: ICD-10-CM

## 2025-02-05 DIAGNOSIS — Z13.29 SCREENING FOR THYROID DISORDER: ICD-10-CM

## 2025-02-05 DIAGNOSIS — Z00.00 HEALTHCARE MAINTENANCE: Primary | ICD-10-CM

## 2025-02-05 DIAGNOSIS — Z00.00 HEALTHCARE MAINTENANCE: ICD-10-CM

## 2025-02-05 DIAGNOSIS — Z13.1 SCREENING FOR DIABETES MELLITUS: ICD-10-CM

## 2025-02-05 DIAGNOSIS — R35.0 FREQUENT URINATION: ICD-10-CM

## 2025-02-05 PROCEDURE — 99213 OFFICE O/P EST LOW 20 MIN: CPT | Performed by: NURSE PRACTITIONER

## 2025-02-05 PROCEDURE — 99395 PREV VISIT EST AGE 18-39: CPT | Performed by: NURSE PRACTITIONER

## 2025-02-05 PROCEDURE — 93000 ELECTROCARDIOGRAM COMPLETE: CPT | Performed by: NURSE PRACTITIONER

## 2025-02-05 PROCEDURE — 3008F BODY MASS INDEX DOCD: CPT | Performed by: NURSE PRACTITIONER

## 2025-02-05 NOTE — PATIENT INSTRUCTIONS
Good to see you today.  Fasting labs, urinalysis and ultrasound of bladder.  PELVIC FLOOR PT  Get back to the gym consistently and graze with small frequent intake and plenty of protein.  Follow up based on above.

## 2025-02-05 NOTE — PROGRESS NOTES
"Subjective   Patient ID: Adrianne Rodríguez is a 34 y.o. female who presents for Annual Exam (CPX).    HPI   Concerned about weight  Diet - sometimes no breakfast  Lunch and dinner is later  Snacker -   Was working out regularly  Exercise  on and off at least twice per week.  Waster - good intake   Coffee - 2 per day  Sleep - 10 PM Nocturia 4-5     Review of Systems    Objective   /82   Temp 36.3 °C (97.4 °F)   Ht 1.651 m (5' 5\")   Wt 102 kg (225 lb)   LMP 01/06/2025 (Approximate)   BMI 37.44 kg/m²     Physical Exam    Assessment/Plan   {Assess/PlanSmartLinks:27050}       " 30-Jun-2023 15:43 normal.      Palpations: Abdomen is soft.   Genitourinary:     Comments: Deferred  Musculoskeletal:         General: Normal range of motion.      Cervical back: Normal range of motion and neck supple.   Skin:     General: Skin is warm.      Capillary Refill: Capillary refill takes 2 to 3 seconds.   Neurological:      Mental Status: She is alert and oriented to person, place, and time. Mental status is at baseline.   Psychiatric:         Mood and Affect: Mood normal.         Behavior: Behavior normal.         Thought Content: Thought content normal.         Judgment: Judgment normal.         Assessment/Plan   Assessment & Plan  Healthcare maintenance    Orders:    CBC; Future    Comprehensive Metabolic Panel; Future    Lipid Panel; Future    Screening for heart disease    Orders:    ECG 12 lead (Clinic Performed)    Screening for thyroid disorder    Orders:    TSH with reflex to Free T4 if abnormal; Future    Screening for diabetes mellitus    Orders:    Hemoglobin A1C; Future    Encounter for vitamin deficiency screening    Orders:    Vitamin D 25-Hydroxy,Total (for eval of Vitamin D levels); Future    Vitamin B12; Future    Iron and TIBC; Future    Frequent urination    Orders:    US bladder; Future    Referral to Physical Therapy; Future    Urinalysis with Reflex Microscopic

## 2025-02-06 LAB
25(OH)D3+25(OH)D2 SERPL-MCNC: 20 NG/ML (ref 30–100)
ALBUMIN SERPL-MCNC: 4.5 G/DL (ref 3.6–5.1)
ALP SERPL-CCNC: 59 U/L (ref 31–125)
ALT SERPL-CCNC: 13 U/L (ref 6–29)
ANION GAP SERPL CALCULATED.4IONS-SCNC: 11 MMOL/L (CALC) (ref 7–17)
APPEARANCE UR: ABNORMAL
AST SERPL-CCNC: 15 U/L (ref 10–30)
BACTERIA #/AREA URNS HPF: ABNORMAL /HPF
BILIRUB SERPL-MCNC: 0.5 MG/DL (ref 0.2–1.2)
BILIRUB UR QL STRIP: NEGATIVE
BUN SERPL-MCNC: 15 MG/DL (ref 7–25)
CALCIUM SERPL-MCNC: 9.6 MG/DL (ref 8.6–10.2)
CHLORIDE SERPL-SCNC: 105 MMOL/L (ref 98–110)
CHOLEST SERPL-MCNC: 198 MG/DL
CHOLEST/HDLC SERPL: 4 (CALC)
CO2 SERPL-SCNC: 24 MMOL/L (ref 20–32)
COLOR UR: ABNORMAL
CREAT SERPL-MCNC: 0.68 MG/DL (ref 0.5–0.97)
EGFRCR SERPLBLD CKD-EPI 2021: 118 ML/MIN/1.73M2
ERYTHROCYTE [DISTWIDTH] IN BLOOD BY AUTOMATED COUNT: 11.8 % (ref 11–15)
EST. AVERAGE GLUCOSE BLD GHB EST-MCNC: 100 MG/DL
EST. AVERAGE GLUCOSE BLD GHB EST-SCNC: 5.5 MMOL/L
GLUCOSE SERPL-MCNC: 83 MG/DL (ref 65–99)
GLUCOSE UR QL STRIP: NEGATIVE
HBA1C MFR BLD: 5.1 % OF TOTAL HGB
HCT VFR BLD AUTO: 40.2 % (ref 35–45)
HDLC SERPL-MCNC: 49 MG/DL
HGB BLD-MCNC: 13.3 G/DL (ref 11.7–15.5)
HGB UR QL STRIP: NEGATIVE
HYALINE CASTS #/AREA URNS LPF: ABNORMAL /LPF
IRON SATN MFR SERPL: 30 % (CALC) (ref 16–45)
IRON SERPL-MCNC: 96 MCG/DL (ref 40–190)
KETONES UR QL STRIP: ABNORMAL
LDLC SERPL CALC-MCNC: 130 MG/DL (CALC)
LEUKOCYTE ESTERASE UR QL STRIP: ABNORMAL
MCH RBC QN AUTO: 30.4 PG (ref 27–33)
MCHC RBC AUTO-ENTMCNC: 33.1 G/DL (ref 32–36)
MCV RBC AUTO: 92 FL (ref 80–100)
NITRITE UR QL STRIP: NEGATIVE
NONHDLC SERPL-MCNC: 149 MG/DL (CALC)
PH UR STRIP: 5.5 [PH] (ref 5–8)
PLATELET # BLD AUTO: 441 THOUSAND/UL (ref 140–400)
PMV BLD REES-ECKER: 9.6 FL (ref 7.5–12.5)
POTASSIUM SERPL-SCNC: 4.5 MMOL/L (ref 3.5–5.3)
PROT SERPL-MCNC: 7.4 G/DL (ref 6.1–8.1)
PROT UR QL STRIP: ABNORMAL
RBC # BLD AUTO: 4.37 MILLION/UL (ref 3.8–5.1)
RBC #/AREA URNS HPF: ABNORMAL /HPF
SERVICE CMNT-IMP: ABNORMAL
SODIUM SERPL-SCNC: 140 MMOL/L (ref 135–146)
SP GR UR STRIP: 1.03 (ref 1–1.03)
SQUAMOUS #/AREA URNS HPF: ABNORMAL /HPF
TIBC SERPL-MCNC: 321 MCG/DL (CALC) (ref 250–450)
TRIGL SERPL-MCNC: 91 MG/DL
TSH SERPL-ACNC: 0.77 MIU/L
VIT B12 SERPL-MCNC: 576 PG/ML (ref 200–1100)
WBC # BLD AUTO: 5 THOUSAND/UL (ref 3.8–10.8)
WBC #/AREA URNS HPF: ABNORMAL /HPF

## 2025-02-07 ENCOUNTER — APPOINTMENT (OUTPATIENT)
Dept: RADIOLOGY | Facility: CLINIC | Age: 35
End: 2025-02-07
Payer: COMMERCIAL

## 2025-02-11 ENCOUNTER — APPOINTMENT (OUTPATIENT)
Dept: RADIOLOGY | Facility: CLINIC | Age: 35
End: 2025-02-11
Payer: COMMERCIAL

## 2025-02-14 ENCOUNTER — APPOINTMENT (OUTPATIENT)
Dept: RADIOLOGY | Facility: CLINIC | Age: 35
End: 2025-02-14
Payer: COMMERCIAL

## 2025-02-28 ENCOUNTER — APPOINTMENT (OUTPATIENT)
Dept: PRIMARY CARE | Facility: CLINIC | Age: 35
End: 2025-02-28
Payer: COMMERCIAL

## 2025-03-03 DIAGNOSIS — R42 VERTIGO: Primary | ICD-10-CM

## 2025-03-23 PROBLEM — Z13.6 SCREENING FOR HEART DISEASE: Status: ACTIVE | Noted: 2023-09-30

## 2025-03-23 PROBLEM — Z13.1 SCREENING FOR DIABETES MELLITUS: Status: ACTIVE | Noted: 2023-09-30

## 2025-03-23 PROBLEM — Z13.29 SCREENING FOR THYROID DISORDER: Status: ACTIVE | Noted: 2023-09-30

## 2025-03-23 PROBLEM — Z13.21 ENCOUNTER FOR VITAMIN DEFICIENCY SCREENING: Status: ACTIVE | Noted: 2023-09-30

## 2025-03-23 ASSESSMENT — ENCOUNTER SYMPTOMS
POLYDIPSIA: 0
UNEXPECTED WEIGHT CHANGE: 1
ARTHRALGIAS: 1
ACTIVITY CHANGE: 1
SLEEP DISTURBANCE: 1
FREQUENCY: 1
APPETITE CHANGE: 1
POLYPHAGIA: 0
PALPITATIONS: 0

## 2025-03-23 NOTE — ASSESSMENT & PLAN NOTE
Orders:    US bladder; Future    Referral to Physical Therapy; Future    Urinalysis with Reflex Microscopic

## 2025-03-23 NOTE — ASSESSMENT & PLAN NOTE
Orders:    Vitamin D 25-Hydroxy,Total (for eval of Vitamin D levels); Future    Vitamin B12; Future    Iron and TIBC; Future

## 2025-04-11 DIAGNOSIS — F41.9 ANXIETY: Primary | ICD-10-CM

## 2025-04-11 RX ORDER — HYDROXYZINE HYDROCHLORIDE 25 MG/1
25 TABLET, FILM COATED ORAL 2 TIMES DAILY
Qty: 60 TABLET | Refills: 0 | Status: SHIPPED | OUTPATIENT
Start: 2025-04-11 | End: 2025-05-11

## 2025-04-25 ENCOUNTER — TELEPHONE (OUTPATIENT)
Dept: PRIMARY CARE | Facility: CLINIC | Age: 35
End: 2025-04-25
Payer: COMMERCIAL

## 2025-04-25 NOTE — TELEPHONE ENCOUNTER
Pt calling, she has a form she needs filled out for a job and wants to know if she can drop it off or send it through My Chart for you to fill out?

## 2025-05-08 ENCOUNTER — APPOINTMENT (OUTPATIENT)
Dept: OBSTETRICS AND GYNECOLOGY | Facility: CLINIC | Age: 35
End: 2025-05-08
Payer: COMMERCIAL

## 2025-06-16 ENCOUNTER — APPOINTMENT (OUTPATIENT)
Dept: PRIMARY CARE | Facility: CLINIC | Age: 35
End: 2025-06-16
Payer: COMMERCIAL

## 2025-06-16 VITALS
BODY MASS INDEX: 35.49 KG/M2 | SYSTOLIC BLOOD PRESSURE: 118 MMHG | HEIGHT: 65 IN | DIASTOLIC BLOOD PRESSURE: 72 MMHG | TEMPERATURE: 97.4 F | WEIGHT: 213 LBS

## 2025-06-16 DIAGNOSIS — Z91.89 AT INCREASED RISK FOR EXPOSURE TO MYCOBACTERIUM TUBERCULOSIS COMPLEX: Primary | ICD-10-CM

## 2025-06-16 DIAGNOSIS — K21.9 GASTROESOPHAGEAL REFLUX DISEASE, UNSPECIFIED WHETHER ESOPHAGITIS PRESENT: ICD-10-CM

## 2025-06-16 PROCEDURE — 99214 OFFICE O/P EST MOD 30 MIN: CPT | Performed by: NURSE PRACTITIONER

## 2025-06-16 PROCEDURE — 3008F BODY MASS INDEX DOCD: CPT | Performed by: NURSE PRACTITIONER

## 2025-06-16 PROCEDURE — 1036F TOBACCO NON-USER: CPT | Performed by: NURSE PRACTITIONER

## 2025-06-16 RX ORDER — NITROFURANTOIN 25; 75 MG/1; MG/1
100 CAPSULE ORAL 2 TIMES DAILY
COMMUNITY
End: 2025-07-09 | Stop reason: WASHOUT

## 2025-06-16 RX ORDER — OMEPRAZOLE 20 MG/1
20 CAPSULE, DELAYED RELEASE ORAL
Qty: 30 CAPSULE | Refills: 3 | Status: SHIPPED | OUTPATIENT
Start: 2025-06-16 | End: 2025-12-13

## 2025-06-16 NOTE — PROGRESS NOTES
"Subjective   Patient ID: Adrianne Rodríguez is a 34 y.o. female who presents for GERD (Need labs for school).  History of Present Illness  Time for update of titers for school.  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  Needs TB screening. 'Had other labs recently.    Took Rx for reflux and stopped and reflux returned.  Asking for refill of Omeprazole  Regular with work outs and had lost some weight.      Review of Systems   Cardiovascular:  Negative for chest pain, palpitations and leg swelling.   Gastrointestinal:  Positive for abdominal pain and nausea.   Endocrine: Negative for cold intolerance, heat intolerance, polydipsia, polyphagia and polyuria.       Physical Exam  Vitals and nursing note reviewed.   Constitutional:       Appearance: She is obese.   HENT:      Head: Normocephalic and atraumatic.   Eyes:      Conjunctiva/sclera: Conjunctivae normal.   Cardiovascular:      Rate and Rhythm: Normal rate and regular rhythm.      Pulses: Normal pulses.      Heart sounds: Normal heart sounds.   Pulmonary:      Effort: Pulmonary effort is normal.      Breath sounds: Normal breath sounds.   Abdominal:      General: Bowel sounds are normal.      Palpations: Abdomen is soft.      Tenderness: There is abdominal tenderness.      Comments: Slight epigastric tenderness   Skin:     General: Skin is warm.   Neurological:      Mental Status: She is alert and oriented to person, place, and time.   Psychiatric:         Mood and Affect: Mood normal.         Behavior: Behavior normal.         Results         Objective     /72   Temp 36.3 °C (97.4 °F)   Ht 1.651 m (5' 5\")   Wt 96.6 kg (213 lb)   BMI 35.45 kg/m²      Assessment & Plan      Assessment & Plan  At increased risk for exposure to Mycobacterium tuberculosis complex    Orders:    T-Spot TB; Future    Gastroesophageal reflux disease, unspecified whether esophagitis present    Orders:    omeprazole (PriLOSEC) 20 mg " DR capsule; Take 1 capsule (20 mg) by mouth once daily in the morning. Take before meals. Do not crush or chew.         Shyann Quiros, APRN-CNP     This medical note was created with the assistance of artificial intelligence (AI) for documentation purposes. The content has been reviewed and confirmed by the healthcare provider for accuracy and completeness. Patient consented to the use of audio recording and use of AI during their visit.

## 2025-06-16 NOTE — ASSESSMENT & PLAN NOTE
Orders:    omeprazole (PriLOSEC) 20 mg DR capsule; Take 1 capsule (20 mg) by mouth once daily in the morning. Take before meals. Do not crush or chew.

## 2025-06-18 LAB
IGNF NEG CNTRL BLD: NORMAL
M TB IFN-G BLD-IMP: NEGATIVE
MITOGEN IGNF.SPOT COUNT BLD: NORMAL
QUEST PANEL A SPOT COUNT: 0
QUEST PANEL B SPOT COUNT: 1

## 2025-07-02 ENCOUNTER — PATIENT MESSAGE (OUTPATIENT)
Dept: PRIMARY CARE | Facility: CLINIC | Age: 35
End: 2025-07-02
Payer: COMMERCIAL

## 2025-07-02 DIAGNOSIS — Z11.3 SCREEN FOR STD (SEXUALLY TRANSMITTED DISEASE): Primary | ICD-10-CM

## 2025-07-07 ASSESSMENT — ENCOUNTER SYMPTOMS
NAUSEA: 1
PALPITATIONS: 0
ABDOMINAL PAIN: 1
POLYPHAGIA: 0
POLYDIPSIA: 0

## 2025-07-08 ENCOUNTER — HOSPITAL ENCOUNTER (OUTPATIENT)
Dept: RADIOLOGY | Facility: HOSPITAL | Age: 35
Discharge: HOME | End: 2025-07-08
Payer: COMMERCIAL

## 2025-07-08 ENCOUNTER — OFFICE VISIT (OUTPATIENT)
Dept: ORTHOPEDIC SURGERY | Facility: CLINIC | Age: 35
End: 2025-07-08
Payer: COMMERCIAL

## 2025-07-08 DIAGNOSIS — M25.521 RIGHT ELBOW PAIN: ICD-10-CM

## 2025-07-08 PROCEDURE — 1036F TOBACCO NON-USER: CPT

## 2025-07-08 PROCEDURE — 73080 X-RAY EXAM OF ELBOW: CPT | Mod: RIGHT SIDE | Performed by: STUDENT IN AN ORGANIZED HEALTH CARE EDUCATION/TRAINING PROGRAM

## 2025-07-08 PROCEDURE — 73080 X-RAY EXAM OF ELBOW: CPT | Mod: RT

## 2025-07-08 PROCEDURE — 99213 OFFICE O/P EST LOW 20 MIN: CPT

## 2025-07-08 ASSESSMENT — PAIN - FUNCTIONAL ASSESSMENT: PAIN_FUNCTIONAL_ASSESSMENT: 0-10

## 2025-07-08 ASSESSMENT — PAIN SCALES - GENERAL: PAINLEVEL_OUTOF10: 4

## 2025-07-08 NOTE — PROGRESS NOTES
KING  Adrianne Rodríguez is a 34 y.o. female  in office today for   Chief Complaint   Patient presents with    Right Elbow - Pain   .  she is concerned about distal biceps tear. She has recently started to lift weights and has some pain at the elbow and in the biceps.  She is wanting to make sure she didn't tear anything.  She has had pain for about 2 weeks. Occasional pain medication as needed.    Past Medical History: right shoulder arthroscopy 5/2024    Medication  Medications Ordered Prior to Encounter[1]    Physical Exam  Constitutional: well developed, well nourished female in no acute distress  Psychiatric: normal mood, appropriate affect  Eyes: sclera anicteric  HENT: normocephalic/atraumatic  CV: regular rate and rhythm   Respiratory: non labored breathing  Integumentary: no rash  Neurological: moves all extremities    Right Elbow Exam     Tenderness   Right elbow tenderness location: distal biceps.     Range of Motion   The patient has normal right elbow ROM.    Tests   Tinel's sign (cubital tunnel): negative    Other   Erythema: absent  Scars: absent    Comments:  Able to palpate the distal biceps tendon through the antecubital fossa  - Perfecto sign              Imaging/Lab:  X-rays were taken today which were reviewed by myself and read by myself and show no acute fracture or dislocation.  No degenerative changes. No soft tissue edema      Assessment  Assessment: right elbow tendonitis    Plan  Plan:  History, physical exam, and imaging were reviewed with patient. Exam was negative for suspect biceps tendon tear.  Discussed RICE and antiinflammatories for pain and inflammation.  Follow Up: Patient to follow up as needed if pain persists or gets worse.      All questions were answered for the patient prior to end of exam and patient addressed their understanding.    Liza Dia PA-C  07/08/25         [1]   Current Outpatient Medications on File Prior to Visit   Medication Sig Dispense Refill     dexAMETHasone (Decadron) 1 mg tablet Take 1 tablet (1 mg) by mouth 1 time for 1 dose. Take 1 tablet at 11 pm 1 tablet 0    hydrOXYzine HCL (Atarax) 25 mg tablet Take 1 tablet (25 mg) by mouth 2 times a day. 60 tablet 0    meclizine (Antivert) 25 mg tablet Take 1 tablet (25 mg) by mouth 3 times a day as needed for dizziness for up to 20 doses. 20 tablet 0    nitrofurantoin, macrocrystal-monohydrate, (Macrobid) 100 mg capsule Take 1 capsule (100 mg) by mouth 2 times a day.      omeprazole (PriLOSEC) 20 mg DR capsule Take 1 capsule (20 mg) by mouth once daily in the morning. Take before meals. Do not crush or chew. 30 capsule 3    triamcinolone (Nasacort) 55 mcg nasal inhaler Administer 2 sprays into each nostril once daily. 16.5 g 11     No current facility-administered medications on file prior to visit.

## 2025-07-09 ENCOUNTER — OFFICE VISIT (OUTPATIENT)
Dept: OBSTETRICS AND GYNECOLOGY | Facility: CLINIC | Age: 35
End: 2025-07-09
Payer: COMMERCIAL

## 2025-07-09 VITALS
HEIGHT: 65 IN | BODY MASS INDEX: 35.32 KG/M2 | SYSTOLIC BLOOD PRESSURE: 98 MMHG | WEIGHT: 212 LBS | DIASTOLIC BLOOD PRESSURE: 42 MMHG

## 2025-07-09 DIAGNOSIS — Z01.419 WELL WOMAN EXAM WITH ROUTINE GYNECOLOGICAL EXAM: Primary | ICD-10-CM

## 2025-07-09 DIAGNOSIS — N64.3 GALACTORRHEA: ICD-10-CM

## 2025-07-09 DIAGNOSIS — Z11.3 ROUTINE SCREENING FOR STI (SEXUALLY TRANSMITTED INFECTION): ICD-10-CM

## 2025-07-09 PROCEDURE — 3008F BODY MASS INDEX DOCD: CPT | Performed by: OBSTETRICS & GYNECOLOGY

## 2025-07-09 PROCEDURE — 99395 PREV VISIT EST AGE 18-39: CPT | Performed by: OBSTETRICS & GYNECOLOGY

## 2025-07-09 PROCEDURE — 1036F TOBACCO NON-USER: CPT | Performed by: OBSTETRICS & GYNECOLOGY

## 2025-07-09 ASSESSMENT — PATIENT HEALTH QUESTIONNAIRE - PHQ9
2. FEELING DOWN, DEPRESSED OR HOPELESS: NOT AT ALL
1. LITTLE INTEREST OR PLEASURE IN DOING THINGS: NOT AT ALL
SUM OF ALL RESPONSES TO PHQ9 QUESTIONS 1 AND 2: 0

## 2025-07-09 ASSESSMENT — PAIN SCALES - GENERAL: PAINLEVEL_OUTOF10: 3

## 2025-07-09 NOTE — PROGRESS NOTES
Subjective   Patient ID: Adrianne Rodríguez is a 34 y.o. female who presents for Annual Exam (Annual Exam with Pap Smear////C/O bilateral breast pain and some milky white discharge from the right breast for a couple weeks.//Chaperone declined.).  HPI  As contained in the chief complaint  Review of Systems  10 systems have been reviewed and are negative and noncontributory to the patient current ailments.    Objective   Physical Exam  Vital signs reviewed    Constitutional: Well nourished, well developed, looks like stated age.  She is sitting comfortably on the exam table in no apparent distress  ENMT: Mucous membranes moist, no apparent lesions   Skin: Warm and dry, no lesions, no rashes  Eyes:  Normal external exam  Head/Neck: Neck supple, no bruits, thyroid symmetrical ,normal size and normal consistency  Cardiovascular: RRR without murmur, S3 or S4, 2+ equal pulses of the extremities.  Respiratory/Thorax:  breathing comfortably, no dyspnea, good chest expansion   Extremities: No deformities.  Edema, discoloration, or pain in BLE, no joint swelling, normal strength  Neurological:  A/Ox3, conversational   Psychiatric:  Alert, pleasant and cordial, age-appropriate, not anxious, or depressed appearing  Breasts: Normal appearance, no skin changes or nipple discharge.  Palpation of the breast and axillae: No no masses palpable, no organomegaly, and no axillary lymphadenopathy  Gastrointestinal: Soft, non distended, bowel sound normal pitch and intensity,no palpable abnormal masses  Genitourinary:  External genitalia: Normal.                                 - Uterus: AV/AF NSSC, mobile and nontender                                -The adnexal areas are free of tenderness or mass                                -There are no cervical lesions; there is no cervical motion tenderness                                -Vagina without lesions, mucosa pink and well-hydrated, discharge is physiologic.                                    -Inspection of Perianal Area: Normal    Assessment/Plan   Diagnoses and all orders for this visit:  Well woman exam with routine gynecological exam  Galactorrhea  - Check TSH and prolactin.         Redd Jones DO 07/09/25 9:35 AM

## 2025-07-10 LAB
C TRACH RRNA SPEC QL NAA+PROBE: NOT DETECTED
N GONORRHOEA RRNA SPEC QL NAA+PROBE: NOT DETECTED
QUEST GC CT AMPLIFIED (ALWAYS MESSAGE): NORMAL
T VAGINALIS RRNA SPEC QL NAA+PROBE: NOT DETECTED

## 2025-07-11 ENCOUNTER — APPOINTMENT (OUTPATIENT)
Dept: PRIMARY CARE | Facility: CLINIC | Age: 35
End: 2025-07-11
Payer: COMMERCIAL

## 2025-07-16 ENCOUNTER — APPOINTMENT (OUTPATIENT)
Dept: PRIMARY CARE | Facility: CLINIC | Age: 35
End: 2025-07-16
Payer: COMMERCIAL

## 2025-09-04 LAB
PROLACTIN SERPL-MCNC: 7.3 NG/ML
T VAGINALIS RRNA SPEC QL NAA+PROBE: NOT DETECTED
TSH SERPL-ACNC: 0.64 MIU/L

## 2025-09-07 LAB
HBV SURFACE AG SERPL QL IA: NORMAL
HCV AB SERPL QL IA: NORMAL
T PALLIDUM AB SER QL IA: NEGATIVE

## 2025-10-02 ENCOUNTER — APPOINTMENT (OUTPATIENT)
Dept: PRIMARY CARE | Facility: CLINIC | Age: 35
End: 2025-10-02
Payer: COMMERCIAL

## 2025-12-01 ENCOUNTER — APPOINTMENT (OUTPATIENT)
Dept: DERMATOLOGY | Facility: CLINIC | Age: 35
End: 2025-12-01
Payer: COMMERCIAL

## (undated) DEVICE — REAMER, ACORN, 8MM

## (undated) DEVICE — Device

## (undated) DEVICE — DRAPE, SHEET, U, W/ADHESIVE STRIP, IMPERVIOUS, 60 X 70 IN, DISPOSABLE, LF, STERILE

## (undated) DEVICE — CANNULA, CLEAR, THREADED, 8.5 X 55MM

## (undated) DEVICE — NEEDLE, SPINAL, QUINCKE, 18 G X 3.5 IN, PINK HUB

## (undated) DEVICE — MASK, FACE, TENET, FOAM POSITIONING, DISPOSABLE

## (undated) DEVICE — PROBE, ARTHROSCOPIC ENERGY 90 DEG WITH SUCTION

## (undated) DEVICE — GOWN, SURGICAL, UROLOGY, IMPERVIOUS, XLONG, XLARGE, DISPOSABLE

## (undated) DEVICE — BUR, OVAL, STERLING, HIGH SPEED, 6 MM

## (undated) DEVICE — APPLICATOR, CHLORAPREP, W/ORANGE TINT, 26ML

## (undated) DEVICE — BUR, GREAT WHITE, 4.2MM

## (undated) DEVICE — SOLUTION, IRRI GATION, LACTATED RINGERS, 3000 ML, BAG

## (undated) DEVICE — CANNULA, CLEAR, THREADED, 7.0 X 75MM

## (undated) DEVICE — TUBING, ARTHROSCOPIC INFLOW, 10K

## (undated) DEVICE — STRIP, SKIN CLOSURE, STERI STRIP, REINFORCED, 0.5 X 4 IN

## (undated) DEVICE — DRESSING, GAUZE, PETROLATUM, PATCH, XEROFORM, 1 X 8 IN, STERILE

## (undated) DEVICE — SUTURE, ETHILON, 3-0, 18 IN, PS1, BLACK

## (undated) DEVICE — DRAPE, SHEET, U, STERI DRAPE, 47 X 51 IN, DISPOSABLE, STERILE

## (undated) DEVICE — TUBING, SUCTION, CONNECTING, NON-CONDUCTIVE, SURE GRIP CONNECTORS, 3/16 IN X 10 FT

## (undated) DEVICE — SYRINGE, 60 CC, LUER LOCK, MONOJECT, W/O CAP, LF

## (undated) DEVICE — KIT, TENODESIS, DISP